# Patient Record
Sex: FEMALE | Race: WHITE | Employment: FULL TIME | URBAN - METROPOLITAN AREA
[De-identification: names, ages, dates, MRNs, and addresses within clinical notes are randomized per-mention and may not be internally consistent; named-entity substitution may affect disease eponyms.]

---

## 2017-01-27 ENCOUNTER — OFFICE VISIT (OUTPATIENT)
Dept: FAMILY MEDICINE CLINIC | Age: 57
End: 2017-01-27

## 2017-01-27 VITALS
TEMPERATURE: 98.2 F | RESPIRATION RATE: 12 BRPM | BODY MASS INDEX: 39.47 KG/M2 | HEART RATE: 91 BPM | SYSTOLIC BLOOD PRESSURE: 159 MMHG | OXYGEN SATURATION: 90 % | WEIGHT: 245.6 LBS | DIASTOLIC BLOOD PRESSURE: 89 MMHG | HEIGHT: 66 IN

## 2017-01-27 DIAGNOSIS — E11.9 TYPE 2 DIABETES MELLITUS WITHOUT COMPLICATION, WITHOUT LONG-TERM CURRENT USE OF INSULIN (HCC): ICD-10-CM

## 2017-01-27 DIAGNOSIS — J44.9 CHRONIC OBSTRUCTIVE PULMONARY DISEASE, UNSPECIFIED COPD TYPE (HCC): Primary | ICD-10-CM

## 2017-01-27 DIAGNOSIS — I10 ESSENTIAL HYPERTENSION: ICD-10-CM

## 2017-01-27 DIAGNOSIS — F17.200 TOBACCO USE DISORDER: ICD-10-CM

## 2017-01-27 DIAGNOSIS — E66.9 OBESITY, CLASS II, BMI 35-39.9: ICD-10-CM

## 2017-01-27 LAB — HBA1C MFR BLD HPLC: 7.1 %

## 2017-01-27 RX ORDER — ALBUTEROL SULFATE 90 UG/1
1 AEROSOL, METERED RESPIRATORY (INHALATION)
Qty: 1 INHALER | Refills: 5 | Status: SHIPPED | OUTPATIENT
Start: 2017-01-27 | End: 2017-07-19 | Stop reason: SDUPTHER

## 2017-01-27 RX ORDER — LEVALBUTEROL INHALATION SOLUTION 1.25 MG/3ML
1.25 SOLUTION RESPIRATORY (INHALATION)
Qty: 30 NEBULE | Refills: 3 | Status: SHIPPED | OUTPATIENT
Start: 2017-01-27 | End: 2017-10-02 | Stop reason: SDUPTHER

## 2017-01-27 RX ORDER — FLUTICASONE FUROATE AND VILANTEROL 200; 25 UG/1; UG/1
1 POWDER RESPIRATORY (INHALATION) DAILY
Qty: 1 INHALER | Refills: 5 | Status: SHIPPED | OUTPATIENT
Start: 2017-01-27 | End: 2017-10-02 | Stop reason: SDUPTHER

## 2017-01-27 NOTE — PROGRESS NOTES
HISTORY OF PRESENT ILLNESS  Kd Henson is a 64 y.o. female. HPI  Cardiovascular Review:  The patient has diabetes, hypertension and hyperlipidemia. Diet and Lifestyle: generally follows a low fat low cholesterol diet, generally follows a low sodium diet, does not rigorously follow a diabetic diet, sedentary, smoker 1 ppd  Home BP Monitoring: is not measured at home. Pertinent ROS: taking medications as instructed, no medication side effects noted, no TIA's, no chest pain on exertion, no dyspnea on exertion, no swelling of ankles. Weight up 20 lbs in last 6 months. Diabetes Mellitus:  Diabetic ROS - medication compliance: compliant most of the time, diabetic diet compliance: noncompliant some of the time, home glucose monitoring: fasting values range 120, nonfasting values range <180, further diabetic ROS: no polyuria or polydipsia, no chest pain, dyspnea or TIA's, no numbness, tingling or pain in extremities, last eye exam approximately 1 year ago. COPD Review:  The patient is being seen for follow up of COPD, asthma. Using Breo daily and Albuterol treatments weekly. Oxygen: She currently is not on home oxygen therapy. Symptoms: chronic dyspnea: severity = moderate: course of sx: intermittent. Patient uses 1 pillows at night. Patient began smoking 30 years ago and currently smokes 1/2 packs per day. Current Outpatient Prescriptions   Medication Sig Dispense Refill    sitaGLIPtin-metFORMIN (JANUMET)  mg per tablet Take 1 Tab by mouth two (2) times daily (with meals). For diabetes 60 Tab 5    albuterol (PROAIR HFA) 90 mcg/actuation inhaler Take 1 Puff by inhalation every four (4) hours as needed for Wheezing. 1 Inhaler 5    levalbuterol (XOPENEX) 1.25 mg/3 mL nebu 3 mL by Nebulization route three (3) times daily as needed. 1 Package 5    fluticasone-vilanterol (BREO ELLIPTA) 200-25 mcg/dose inhaler Take 1 Puff by inhalation daily.  1 Inhaler 5    Nebulizer & Compressor machine Machine and supplies for as needed nebulizer treatments. Dx: J44.9 1 Each 0     Past Medical History   Diagnosis Date    Anxiety     Arthralgia     Asthma     Asthma     Chronic obstructive pulmonary disease ([facility]      PFT 8/2015    Crack cocaine use      quit; used for 8 years    Diabetes ([facility]     Hemorrhoid     Psychiatric disorder      ANXIETY.  Sleep apnea, obstructive 2016    Tobacco use       Lab Results   Component Value Date/Time    Hemoglobin A1c 6.2 03/16/2016 04:56 AM    Hemoglobin A1c 6.6 02/23/2016 08:43 AM    Hemoglobin A1c 6.9 08/04/2015 03:05 PM    Hemoglobin A1c 6.1 05/15/2012 08:47 AM    Glucose 127 08/22/2016 10:44 AM    Glucose (POC) 135 03/17/2016 11:31 AM    Microalb/Creat ratio (ug/mg creat.) 7.7 02/23/2016 08:43 AM    LDL, calculated 65 08/22/2016 10:44 AM    Creatinine 0.72 08/22/2016 10:44 AM      Lab Results   Component Value Date/Time    Cholesterol, total 140 08/22/2016 10:44 AM    Cholesterol, Total 141 10/01/2012 10:54 AM    HDL Cholesterol 41 08/22/2016 10:44 AM    LDL, calculated 65 08/22/2016 10:44 AM    Triglyceride 172 08/22/2016 10:44 AM       Review of Systems   Constitutional: Negative for malaise/fatigue and weight loss. Respiratory: Positive for cough and wheezing. Negative for sputum production. Cardiovascular: Negative for palpitations and leg swelling. Gastrointestinal: Negative for heartburn. Musculoskeletal: Negative for back pain, joint pain and myalgias. Neurological: Negative for dizziness and weakness. Psychiatric/Behavioral: Negative for depression. Physical Exam   Constitutional: She is oriented to person, place, and time. She appears well-developed and well-nourished. Neck: Normal range of motion. Neck supple. No JVD present. Carotid bruit is not present. No thyromegaly present. Cardiovascular: Normal rate, regular rhythm and intact distal pulses. Exam reveals no gallop and no friction rub.     No murmur heard.  Pulmonary/Chest: Effort normal. No respiratory distress. She has decreased breath sounds in the right lower field and the left lower field. She has no wheezes. She has no rhonchi. Musculoskeletal: She exhibits no edema. Lymphadenopathy:     She has no cervical adenopathy. Neurological: She is alert and oriented to person, place, and time. Psychiatric: She has a normal mood and affect. Her behavior is normal.   Nursing note and vitals reviewed. ASSESSMENT and Joe Kay was seen today for documentation. Diagnoses and all orders for this visit:    Chronic obstructive pulmonary disease, unspecified COPD type ([address]  Stable, no changes to current therapy. FMLA forms completed. -     albuterol (PROAIR HFA) 90 mcg/actuation inhaler; Take 1 Puff by inhalation every four (4) hours as needed for Wheezing.  -     levalbuterol (XOPENEX) 1.25 mg/3 mL nebu; 3 mL by Nebulization route every six (6) hours as needed. -     fluticasone-vilanterol (BREO ELLIPTA) 200-25 mcg/dose inhaler; Take 1 Puff by inhalation daily. Essential hypertension  Not to goal. Recommended adding ACE or ARB for blood pressure control and protection from diabetic neuropathy. This is refused by patient. She will follow up with PCP in one month to discuss the possible need for additional medication. Type 2 diabetes mellitus without complication, without long-term current use of insulin ([facility])  AMB POC HEMOGLOBIN A1C: 7.1%. Borderline control. Reviewed diet and lifestyle changes. -     Refill SITagliptin-metFORMIN (JANUMET)  mg per tablet; Take 1 Tab by mouth two (2) times daily (with meals). For diabetes    Obesity, Class II, BMI 35-39.9 ([facility])  Reviewed diet and lifestyle changes. Tobacco use disorder  Counseled patient on need to quit smoking. I have discussed the diagnosis with the patient and the intended plan as seen in the above orders.   The patient has received an after-visit summary along with patient information handout. I have discussed medication side effects and warnings with the patient as well. Follow-up Disposition:  Return in about 4 months (around 5/27/2017) for diabetes.

## 2017-01-27 NOTE — PATIENT INSTRUCTIONS

## 2017-01-27 NOTE — MR AVS SNAPSHOT
Visit Information Date & Time Provider Department Dept. Phone Encounter #  
 1/27/2017  4:15 PM Domo Lyon  [facility] [address] [phone] [id number] Follow-up Instructions Return in about 4 months (around 5/27/2017) for diabetes. Your Appointments 2/20/2017  9:45 AM  
ROUTINE CARE with Andree Nj MD  
[facility]) Appt Note: 6 months follow up visit [address] [phone]  
[phone]  
  
   
 [address] [phone] Upcoming Health Maintenance Date Due  
 EYE EXAM RETINAL OR DILATED Q1 7/21/2015 PAP AKA CERVICAL CYTOLOGY 1/30/2016 HEMOGLOBIN A1C Q6M 2/22/2017 MICROALBUMIN Q1 2/23/2017 BREAST CANCER SCRN MAMMOGRAM 4/28/2017 LIPID PANEL Q1 8/22/2017 FOOT EXAM Q1 8/30/2017 COLONOSCOPY 1/10/2019 DTaP/Tdap/Td series (2 - Td) 12/21/2020 Allergies as of 1/27/2017  Review Complete On: 1/27/2017 By: Domo Lyon NP Severity Noted Reaction Type Reactions Pcn [Penicillins] High 07/28/2010    Anaphylaxis Clindamycin  07/28/2010    Itching SWELLING & ITCHING IN VAGINAL AREA. Current Immunizations  Reviewed on 4/15/2015 Name Date Influenza Vaccine (Quad) PF 10/2/2015 Influenza Vaccine Split 12/21/2010 Pneumococcal Vaccine (Unspecified Type) 1/1/2009 TDAP Vaccine 12/21/2010 Not reviewed this visit You Were Diagnosed With   
  
 Codes Comments Chronic obstructive pulmonary disease, unspecified COPD type ([facility]    -  Primary ICD-10-CM: J44.9 ICD-9-CM: 196 Essential hypertension     ICD-10-CM: I10 
ICD-9-CM: 401.9 Type 2 diabetes mellitus without complication, without long-term current use of insulin (HCC)     ICD-10-CM: E11.9 ICD-9-CM: 250.00 Obesity, Class II, BMI 35-39.9 (HCC)     ICD-10-CM: E66.01 
ICD-9-CM: 278.01 Tobacco use disorder     ICD-10-CM: F17.200 ICD-9-CM: 305.1 Vitals BP Pulse Temp Resp Height(growth percentile) Weight(growth percentile) 159/89 (BP 1 Location: Left arm, BP Patient Position: Sitting) 91 98.2 °F (36.8 °C) (Oral) 12 5' 6\" (1.676 m) 245 lb 9.6 oz (111.4 kg) SpO2 BMI OB Status Smoking Status 90% 39.64 kg/m2 Postmenopausal Current Some Day Smoker Vitals History BMI and BSA Data Body Mass Index Body Surface Area  
 39.64 kg/m 2 2.28 m 2 Preferred Pharmacy Pharmacy Name Phone [facility] [address] [facility] [phone] Your Updated Medication List  
  
   
This list is accurate as of: 1/27/17  5:00 PM.  Always use your most recent med list.  
  
  
  
  
 albuterol 90 mcg/actuation inhaler Commonly known as:  PROAIR HFA Take 1 Puff by inhalation every four (4) hours as needed for Wheezing. fluticasone-vilanterol 200-25 mcg/dose inhaler Commonly known as:  BREO ELLIPTA Take 1 Puff by inhalation daily. levalbuterol 1.25 mg/3 mL Nebu Commonly known as:  XOPENEX  
3 mL by Nebulization route every six (6) hours as needed. Nebulizer & Compressor machine Machine and supplies for as needed nebulizer treatments. Dx: M26.2 SITagliptin-metFORMIN  mg per tablet Commonly known as:  [place] Take 1 Tab by mouth two (2) times daily (with meals). For diabetes Prescriptions Sent to Pharmacy Refills SITagliptin-metFORMIN (JANUMET)  mg per tablet 5 Sig: Take 1 Tab by mouth two (2) times daily (with meals). For diabetes Class: Normal  
 Pharmacy: [address] [place] [address] Ph #: [phone] Route: Oral  
 albuterol (PROAIR HFA) 90 mcg/actuation inhaler 5 Sig: Take 1 Puff by inhalation every four (4) hours as needed for Wheezing. Class: Normal  
 Pharmacy: [id number] [place] [address] Ph #: [phone] Route: Inhalation  
 levalbuterol (XOPENEX) 1.25 mg/3 mL nebu 3 Sig: 3 mL by Nebulization route every six (6) hours as needed. Class: Normal  
 Pharmacy: [address] Ph #: [phone] Route: Nebulization  
 fluticasone-vilanterol (BREO ELLIPTA) 200-25 mcg/dose inhaler 5 Sig: Take 1 Puff by inhalation daily. Class: Normal  
 Pharmacy: [address] Ph #: [phone] Route: Inhalation We Performed the Following AMB POC HEMOGLOBIN A1C [34871 CPT(R)] Follow-up Instructions Return in about 4 months (around 5/27/2017) for diabetes. Patient Instructions Nutrition Tips for Diabetes: After Your Visit Your Care Instructions A healthy diet is important to manage diabetes. It helps you lose weight (if you need to) and keep it off. It gives you the nutrition and energy your body needs and helps prevent heart disease. But a diet for diabetes does not mean that you have to eat special foods. You can eat what your family eats, including occasional sweets and other favorites. But you do have to pay attention to how often you eat and how much you eat of certain foods. The right plan for you will give you meals that help you keep your blood sugar at healthy levels. Try to eat a variety of foods and to spread carbohydrate throughout the day. Carbohydrate raises blood sugar higher and more quickly than any other nutrient does. Carbohydrate is found in sugar, breads and cereals, fruit, starchy vegetables such as potatoes and corn, and milk and yogurt. You may want to work with a dietitian or diabetes educator to help you plan meals and snacks. A dietitian or diabetes educator also can help you lose weight if that is one of your goals. The following tips can help you enjoy your meals and stay healthy. Follow-up care is a key part of your treatment and safety.  Be sure to make and go to all appointments, and call your doctor if you are having problems. Its also a good idea to know your test results and keep a list of the medicines you take. How can you care for yourself at home? · Learn which foods have carbohydrate and how much carbohydrate to eat. A dietitian or diabetes educator can help you learn to keep track of how much carbohydrate you eat. · Spread carbohydrate throughout the day. Eat some carbohydrate at all meals, but do not eat too much at any one time. · Plan meals to include food from all the food groups. These are the food groups and some example portion sizes: ¨ Grains: 1 slice of bread (1 ounce), ½ cup of cooked cereal, and 1/3 cup of cooked pasta or rice. These have about 15 grams of carbohydrate in a serving. Choose whole grains such as whole wheat bread or crackers, oatmeal, and brown rice more often than refined grains. ¨ Fruit: 1 small fresh fruit, such as an apple or orange; ½ of a banana; ½ cup of chopped, cooked, or canned fruit; ½ cup of fruit juice; 1 cup of melon or raspberries; and 2 tablespoons of dried fruit. These have about 15 grams of carbohydrate in a serving. ¨ Dairy: 1 cup of nonfat or low-fat milk and 2/3 cup of plain yogurt. These have about 15 grams of carbohydrate in a serving. ¨ Protein foods: Beef, chicken, turkey, fish, eggs, tofu, cheese, cottage cheese, and peanut butter. A serving size of meat is 3 ounces, which is about the size of a deck of cards. Examples of meat substitute serving sizes (equal to 1 ounce of meat) are 1/4 cup of cottage cheese, 1 egg, 1 tablespoon of peanut butter, and ½ cup of tofu. These have very little or no carbohydrate per serving. ¨ Vegetables: Starchy vegetables such as ½ cup of cooked dried beans, peas, potatoes, or corn have about 15 grams of carbohydrate.  Nonstarchy vegetables have very little carbohydrate, such as 1 cup of raw leafy vegetables (such as spinach), ½ cup of other vegetables (cooked or chopped), and 3/4 cup of vegetable juice. · Use the plate format to plan meals. It is a good, quick way to make sure that you have a balanced meal. It also helps you spread carbohydrate throughout the day. You divide your plate by types of foods. Put vegetables on half the plate, meat or meat substitutes on one-quarter of the plate, and a grain or starchy vegetable (such as brown rice or a potato) in the final quarter of the plate. To this you can add a small piece of fruit and 1 cup of milk or yogurt, depending on how much carbohydrate you are supposed to eat at a meal. 
· Talk to your dietitian or diabetes educator about ways to add limited amounts of sweets into your meal plan. You can eat these foods now and then, as long as you include the amount of carbohydrate they have in your daily carbohydrate allowance. · If you drink alcohol, limit it to no more than 1 drink a day for women and 2 drinks a day for men. If you are pregnant, no amount of alcohol is known to be safe. · Protein, fat, and fiber do not raise blood sugar as much as carbohydrate does. If you eat a lot of these nutrients in a meal, your blood sugar will rise more slowly than it would otherwise. · Limit saturated fats, such as those from meat and dairy products. Try to replace it with monounsaturated fat, such as olive oil. This is a healthier choice because people who have diabetes are at higher-than-average risk of heart disease. But use a modest amount of olive oil. A tablespoon of olive oil has 14 grams of fat and 120 calories. · Exercise lowers blood sugar. If you take insulin by shots or pump, you can use less than you would if you were not exercising. Keep in mind that timing matters. If you exercise within 1 hour after a meal, your body may need less insulin for that meal than it would if you exercised 3 hours after the meal. Test your blood sugar to find out how exercise affects your need for insulin. · Exercise on most days of the week. Aim for at least 30 minutes. Exercise helps you stay at a healthy weight and helps your body use insulin. Walking is an easy way to get exercise. Gradually increase the amount you walk every day. You also may want to swim, bike, or do other activities. When you eat out · Learn to estimate the serving sizes of foods that have carbohydrate. If you measure food at home, it will be easier to estimate the amount in a serving of restaurant food. · If the meal you order has too much carbohydrate (such as potatoes, corn, or baked beans), ask to have a low-carbohydrate food instead. Ask for a salad or green vegetables. · If you use insulin, check your blood sugar before and after eating out to help you plan how much to eat in the future. · If you eat more carbohydrate at a meal than you had planned, take a walk or do other exercise. This will help lower your blood sugar. Where can you learn more? Go to Dealflicks.be Enter Q904 in the search box to learn more about \"Nutrition Tips for Diabetes: After Your Visit. \"  
© 7792-8772 Healthwise, Incorporated. Care instructions adapted under license by Mehran Vick (which disclaims liability or warranty for this information). This care instruction is for use with your licensed healthcare professional. If you have questions about a medical condition or this instruction, always ask your healthcare professional. [address] any warranty or liability for your use of this information. Content Version: 48.5.385628; Current as of: June 4, 2014 Introducing [facility] & HE[facility] SERVICES! Dear Esperanza Hammond: Thank you for requesting a [employer] account. Our records indicate that you already have an active [employer] account. You can access your account anytime at [email or web address] Did you know that you can access your hospital and ER discharge instructions at any time in [employer]? You can also review all of your test results from your hospital stay or ER visit. Additional Information If you have questions, please visit the Frequently Asked Questions section of the [employer] website at [email or web address] Remember, [employer] is NOT to be used for urgent needs. For medical emergencies, dial 911. Now available from your iPhone and Android! Please provide this summary of care documentation to your next provider. Your primary care clinician is listed as Thuy Landry. If you have any questions after today's visit, please call [phone].

## 2017-01-27 NOTE — PROGRESS NOTES
1. Have you been to the ER, urgent care clinic since your last visit? Hospitalized since your last visit? No    2. Have you seen or consulted any other health care providers outside of the [address] [place] [address] since your last visit? Include any pap smears or colon screening.  No       Chief Complaint   Patient presents with    Documentation     FMLA for COPD

## 2017-04-12 ENCOUNTER — OFFICE VISIT (OUTPATIENT)
Dept: FAMILY MEDICINE CLINIC | Age: 57
End: 2017-04-12

## 2017-04-12 VITALS
DIASTOLIC BLOOD PRESSURE: 81 MMHG | TEMPERATURE: 98.6 F | HEART RATE: 87 BPM | OXYGEN SATURATION: 90 % | WEIGHT: 236.6 LBS | SYSTOLIC BLOOD PRESSURE: 128 MMHG | RESPIRATION RATE: 16 BRPM | HEIGHT: 66 IN | BODY MASS INDEX: 38.02 KG/M2

## 2017-04-12 DIAGNOSIS — J44.9 CHRONIC OBSTRUCTIVE PULMONARY DISEASE, UNSPECIFIED COPD TYPE (HCC): ICD-10-CM

## 2017-04-12 DIAGNOSIS — J45.30 MILD PERSISTENT ASTHMA WITHOUT COMPLICATION: ICD-10-CM

## 2017-04-12 DIAGNOSIS — K63.5 POLYP OF COLON, UNSPECIFIED PART OF COLON, UNSPECIFIED TYPE: ICD-10-CM

## 2017-04-12 DIAGNOSIS — E66.9 OBESITY, CLASS II, BMI 35-39.9: ICD-10-CM

## 2017-04-12 DIAGNOSIS — Z12.31 VISIT FOR SCREENING MAMMOGRAM: ICD-10-CM

## 2017-04-12 DIAGNOSIS — E78.6 LOW HDL (UNDER 40): ICD-10-CM

## 2017-04-12 DIAGNOSIS — E11.9 TYPE 2 DIABETES MELLITUS WITHOUT COMPLICATION, WITHOUT LONG-TERM CURRENT USE OF INSULIN (HCC): Primary | ICD-10-CM

## 2017-04-12 DIAGNOSIS — R60.9 EDEMA, UNSPECIFIED TYPE: ICD-10-CM

## 2017-04-12 DIAGNOSIS — F17.200 TOBACCO USE DISORDER: ICD-10-CM

## 2017-04-12 RX ORDER — FUROSEMIDE 20 MG/1
20 TABLET ORAL DAILY
Qty: 30 TAB | Refills: 5 | Status: SHIPPED | OUTPATIENT
Start: 2017-04-12

## 2017-04-12 NOTE — MR AVS SNAPSHOT
Visit Information Date & Time Provider Department Dept. Phone Encounter #  
 4/12/2017 10:00 AM Carine Camacho  [address] [phone] [id number] Follow-up Instructions Return in about 2 weeks (around 4/26/2017) for COPD follow up, 10:00 AM. Upcoming Health Maintenance Date Due  
 EYE EXAM RETINAL OR DILATED Q1 7/21/2015 PAP AKA CERVICAL CYTOLOGY 1/30/2016 MICROALBUMIN Q1 2/23/2017 BREAST CANCER SCRN MAMMOGRAM 4/28/2017 HEMOGLOBIN A1C Q6M 7/27/2017 LIPID PANEL Q1 8/22/2017 FOOT EXAM Q1 8/30/2017 COLONOSCOPY 1/10/2019 DTaP/Tdap/Td series (2 - Td) 12/21/2020 Allergies as of 4/12/2017  Review Complete On: 4/12/2017 By: Carine Camacho MD  
  
 Severity Noted Reaction Type Reactions Pcn [Penicillins] High 07/28/2010    Anaphylaxis Clindamycin  07/28/2010    Itching SWELLING & ITCHING IN VAGINAL AREA. Current Immunizations  Reviewed on 4/15/2015 Name Date Influenza Vaccine (Quad) PF 10/2/2015 Influenza Vaccine Split 12/21/2010 Pneumococcal Vaccine (Unspecified Type) 1/1/2009 TDAP Vaccine 12/21/2010 Not reviewed this visit You Were Diagnosed With   
  
 Codes Comments Type 2 diabetes mellitus without complication, without long-term current use of insulin (HCC)    -  Primary ICD-10-CM: E11.9 ICD-9-CM: 250.00 Chronic obstructive pulmonary disease, unspecified COPD type ([facility]     ICD-10-CM: J44.9 ICD-9-CM: 869 Obesity, Class II, BMI 35-39.9 (HCC)     ICD-10-CM: E66.9 ICD-9-CM: 278.00 Low HDL (under 40)     ICD-10-CM: E78.6 ICD-9-CM: 272.5 Visit for screening mammogram     ICD-10-CM: Z12.31 
ICD-9-CM: V76.12 Tobacco use disorder     ICD-10-CM: F17.200 ICD-9-CM: 305.1 Mild persistent asthma without complication     JTN-83-SL: J45.30 ICD-9-CM: 493.90 Edema, unspecified type     ICD-10-CM: R60.9 ICD-9-CM: 844. 3 Vitals BP Pulse Temp Resp Height(growth percentile) Weight(growth percentile) 128/81 (BP 1 Location: Left arm, BP Patient Position: Sitting) 87 98.6 °F (37 °C) (Oral) 16 5' 6\" (1.676 m) 236 lb 9.6 oz (107.3 kg) SpO2 BMI OB Status Smoking Status 90% 38.19 kg/m2 Postmenopausal Current Some Day Smoker Vitals History BMI and BSA Data Body Mass Index Body Surface Area  
 38.19 kg/m 2 2.24 m 2 Preferred Pharmacy Pharmacy Name Phone [facility] [address] [facility] [phone] Your Updated Medication List  
  
   
This list is accurate as of: 4/12/17 10:47 AM.  Always use your most recent med list.  
  
  
  
  
 albuterol 90 mcg/actuation inhaler Commonly known as:  PROAIR HFA Take 1 Puff by inhalation every four (4) hours as needed for Wheezing. fluticasone-vilanterol 200-25 mcg/dose inhaler Commonly known as:  BREO ELLIPTA Take 1 Puff by inhalation daily. furosemide 20 mg tablet Commonly known as:  LASIX Take 1 Tab by mouth daily. levalbuterol 1.25 mg/3 mL Nebu Commonly known as:  XOPENEX  
3 mL by Nebulization route every six (6) hours as needed. Nebulizer & Compressor machine Machine and supplies for as needed nebulizer treatments. Dx: W01.7 SITagliptin-metFORMIN  mg per tablet Commonly known as:  Khoi Zhang Take 1 Tab by mouth two (2) times daily (with meals). For diabetes Prescriptions Sent to Pharmacy Refills  
 furosemide (LASIX) 20 mg tablet 5 Sig: Take 1 Tab by mouth daily. Class: Normal  
 Pharmacy: [address] [place] [address], [address] [facility] Ph #: [phone] Route: Oral  
  
We Performed the Following HEMOGLOBIN A1C WITH EAG [24941 CPT(R)] LEVALBUTEROL, INHAL. SOL., FDA-APPROVED FINAL, NON-COMPOUND UNIT DOSE, 0.5 MG [ HCPCS] LIPID PANEL [30201 CPT(R)] METABOLIC PANEL, COMPREHENSIVE [89375 CPT(R)] MICROALBUMIN, UR, RAND W/ MICROALBUMIN/CREA RATIO [id number] CPT(R)] AR INHAL RX, AIRWAY OBST/DX SPUTUM INDUCT [id number] CPT(R)] Follow-up Instructions Return in about 2 weeks (around 4/26/2017) for COPD follow up, 10:00 AM. To-Do List   
 04/12/2017 Imaging:  FRANSISCO MAMMO BI SCREENING INCL CAD Introducing [facility] & HEALTH SERVICES! Dear Latanya Navarro: Thank you for requesting a [employer] account. Our records indicate that you already have an active [employer] account. You can access your account anytime at [email or web address] Did you know that you can access your hospital and ER discharge instructions at any time in [employer]? You can also review all of your test results from your hospital stay or ER visit. Additional Information If you have questions, please visit the Frequently Asked Questions section of the [employer] website at [email or web address] Remember, [employer] is NOT to be used for urgent needs. For medical emergencies, dial 911. Now available from your iPhone and Android! Please provide this summary of care documentation to your next provider. Your primary care clinician is listed as Albino Lora. If you have any questions after today's visit, please call [phone].

## 2017-04-12 NOTE — PROGRESS NOTES
HISTORY OF PRESENT ILLNESS  Chico Gillette is a 64 y.o. female. Blood pressure 128/81, pulse 87, temperature 98.6 °F (37 °C), temperature source Oral, resp. rate 16, height 5' 6\" (1.676 m), weight 236 lb 9.6 oz (107.3 kg), SpO2 90 %. Body mass index is 38.19 kg/(m^2). Chief Complaint   Patient presents with    Diabetes    COPD      HPI   Poonam AQUINO Eliza Gillette 64 y.o. female  presents to the office today for a follow up on diabetes and COPD. Bp at office today 128/81. DM2: Last A1c 7.1% per POC on 01/17/17, increased from A1c of 6.7% on 08/22/16. Pt continues with Janumet  mg BID. Diabetes control is sub-optimal with A1c 7.1% on 01/17/17. Counseled pt on diet and exercise today. Will reassess A1c today. COPD/allergic rhinitis: Pt notes her allergies and COPD are flaring up with associated wheezing, maxillary sinus pressure, chest tightness, and sneezing. She also alternated between chills and hot flashes last night. She received Xopenex nebulizer treatment today at office and states she received a treatment last night as well. Pt also continues with her Breo Ellipta inhaler regularly and Allegra. Pt notes she checks her oxygen saturation levels at home and it has been running 90-91% in the past few days. Oxygen saturation today at office was 90%. Work note has been written today to excuse pt from work until 04/18/17 due to her COPD exacerbation. Edema: Pt notes increased LE swelling in the evenings and would like to restart her Lasix. I have advised she can start taking Lasix 20 mg daily. Health maintenance: Pt notes she will complete pap smear next week at [facility]. She is due for mammogram and orders have been placed today. Pt notes she is down to 3-4 cigarettes daily. I have counseled pt on the importance of smoking cessation. Current Outpatient Prescriptions   Medication Sig Dispense Refill    furosemide (LASIX) 20 mg tablet Take 1 Tab by mouth daily.  30 Tab 5    SITagliptin-metFORMIN (JANUMET)  mg per tablet Take 1 Tab by mouth two (2) times daily (with meals). For diabetes 60 Tab 5    albuterol (PROAIR HFA) 90 mcg/actuation inhaler Take 1 Puff by inhalation every four (4) hours as needed for Wheezing. 1 Inhaler 5    levalbuterol (XOPENEX) 1.25 mg/3 mL nebu 3 mL by Nebulization route every six (6) hours as needed. 30 Nebule 3    fluticasone-vilanterol (BREO ELLIPTA) 200-25 mcg/dose inhaler Take 1 Puff by inhalation daily. 1 Inhaler 5    Nebulizer & Compressor machine Machine and supplies for as needed nebulizer treatments. Dx: J44.9 1 Each 0     Allergies   Allergen Reactions    Pcn [Penicillins] Anaphylaxis    Clindamycin Itching     SWELLING & ITCHING IN VAGINAL AREA. Past Medical History:   Diagnosis Date    Anxiety     Arthralgia     Asthma     Asthma     Chronic obstructive pulmonary disease ([address]     PFT 8/2015    Crack cocaine use     quit; used for 8 years    Diabetes ([address]     Hemorrhoid     Psychiatric disorder     ANXIETY.  Sleep apnea, obstructive 2016    Tobacco use      Past Surgical History:   Procedure Laterality Date    ENDOSCOPY, COLON, DIAGNOSTIC  2008    F/U 5 years Dr. Stanford Stark HX GI      COLONOSCOPY X 1    HX HEENT      WISDOM TEETH EXTRACTION. Family History   Problem Relation Age of Onset   Elzie Barefoot Cancer Mother      colon    Diabetes Father     Hypertension Father      Social History   Substance Use Topics    Smoking status: Current Some Day Smoker     Packs/day: 0.25     Years: 22.00     Types: Cigarettes    Smokeless tobacco: Never Used      Comment: smokes about 5 cigarettes/day    Alcohol use No        Review of Systems   Constitutional: Positive for chills. Negative for malaise/fatigue. HENT:        + maxillary sinus pressure  + sneezing    Eyes: Negative for blurred vision. Respiratory: Positive for shortness of breath and wheezing.     Cardiovascular: Negative for chest pain and leg swelling. Musculoskeletal: Negative. Neurological: Negative. Negative for dizziness and headaches. Endo/Heme/Allergies: Positive for environmental allergies. All other systems reviewed and are negative. Physical Exam   Constitutional: She is oriented to person, place, and time. She appears well-developed and well-nourished. No distress. HENT:   Head: Normocephalic and atraumatic. Neck: Carotid bruit is not present. Cardiovascular: Normal rate, regular rhythm, normal heart sounds and intact distal pulses. Exam reveals no gallop and no friction rub. No murmur heard. Pulmonary/Chest: Effort normal. No respiratory distress. She has wheezes (scattered bilaterally ). She has no rales. Musculoskeletal: She exhibits edema (1+ ). Neurological: She is alert and oriented to person, place, and time. Skin: She is not diaphoretic. Psychiatric: She has a normal mood and affect. Her behavior is normal. Judgment and thought content normal.   Nursing note and vitals reviewed. ASSESSMENT and Odette Dash was seen today for diabetes and copd. Diagnoses and all orders for this visit:    Type 2 diabetes mellitus without complication, without long-term current use of insulin ([facility])  -     METABOLIC PANEL, COMPREHENSIVE  -     LIPID PANEL  -     HEMOGLOBIN A1C WITH EAG  -     MICROALBUMIN, UR, RAND W/ MICROALBUMIN/CREA RATIO  - Diabetes control is sub-optimal with A1c 7.1% per POC on 01/17/17. Counseled pt on diet and exercise. Will reassess A1c today. Chronic obstructive pulmonary disease, unspecified COPD type ([facility])  -     LEVALBUTEROL, INHAL. SOL., FDA-APPROVED FINAL, NON-COMPOUND UNIT DOSE, 0.5 MG  -     AK INHAL RX, AIRWAY OBST/DX SPUTUM INDUCT  - Continue with Xopenex nebulizer treatment every 4-6 hours until breathing clears up and continue to monitor pulse ox. Also take Breo Elliptor inhaler regularly. A work note has been written for pt today to excuse her from work until 04/18/17. Obesity, Class II, BMI 35-39.9 ([facility])  I have reviewed/discussed the above normal BMI with the patient. I have recommended the following interventions: dietary management education, guidance, and counseling, encourage exercise and monitor weight . Low HDL (under 40)  -     LIPID PANEL  - Presumed stable, will assess levels today    Visit for screening mammogram  -     [facility] MAMMO BI SCREENING INCL CAD; Future    Tobacco use disorder  The patient was counseled on the dangers of tobacco use, and was advised to quit. Reviewed strategies to maximize success, including removing cigarettes and smoking materials from environment. Mild persistent asthma without complication  Continue with Xopenex nebulizer treatment every 4-6 hours until breathing clears up and continue to monitor pulse ox. Also take Breo Elliptor inhaler regularly. A work note has been written for pt to excuse her from work until 04/18/17 due to COPD exacerbation. Edema, unspecified type  -     furosemide (LASIX) 20 mg tablet; Take 1 Tab by mouth daily.  - Pt has 1+ edema bilaterally. Advised her to restart Lasix for improvement. Polyp of colon, unspecified part of colon, unspecified type  Pt advised to follow up in three years for repeat colonoscopy      Follow-up Disposition:  Return in about 2 weeks (around 4/26/2017) for COPD follow up, 10:00 AM.     Medication risks/benefits/costs/interactions/alternatives discussed with patient. Advised patient to call back or return to office if symptoms worsen/change/persist.  If patient cannot reach us or should anything more severe/urgent arise he/she should proceed directly to the nearest emergency department. Discussed expected course/resolution/complications of diagnosis in detail with patient. Patient given a written after visit summary which includes her diagnoses, current medications and vitals. Patient expressed understanding with the diagnosis and plan.     Written by Johanne Shone, scribe, as dictated by Moira Delong M.D.    I have reviewed and agree with the above note and have made corrections where appropriate, Dr. Stiven Velasquez MD

## 2017-04-12 NOTE — LETTER
NOTIFICATION RETURN TO WORK  
 
4/12/2017 10:43 AM 
 
Ms. Bhavna Fine [address] [phone] To Whom It May Concern: 
 
Bhavna Fine is currently under the care of CHARLIE Cat 53. She will return to work on: 4/18/17 Pt has been taken out due to COPD exacerbation. If there are questions or concerns please have the patient contact our office. Sincerely, Eneida Barnes MD

## 2017-04-12 NOTE — PROGRESS NOTES
Chief Complaint   Patient presents with    Diabetes    COPD       1. Have you been to the ER, urgent care clinic since your last visit? Hospitalized since your last visit? No    2. Have you seen or consulted any other health care providers outside of the [address] [place] [address] since your last visit? Include any pap smears or colon screening. Dr. Isatu Monet, dermatologist    Body mass index is 38.19 kg/(m^2).

## 2017-04-13 ENCOUNTER — TELEPHONE (OUTPATIENT)
Dept: FAMILY MEDICINE CLINIC | Age: 57
End: 2017-04-13

## 2017-04-13 DIAGNOSIS — J45.40 MODERATE PERSISTENT ASTHMA WITHOUT COMPLICATION: Primary | ICD-10-CM

## 2017-04-13 LAB
ALBUMIN SERPL-MCNC: 4.4 G/DL (ref 3.5–5.5)
ALBUMIN/CREAT UR: 5.6 MG/G CREAT (ref 0–30)
ALBUMIN/GLOB SERPL: 1.4 {RATIO} (ref 1.2–2.2)
ALP SERPL-CCNC: 93 IU/L (ref 39–117)
ALT SERPL-CCNC: 15 IU/L (ref 0–32)
AST SERPL-CCNC: 11 IU/L (ref 0–40)
BILIRUB SERPL-MCNC: 0.4 MG/DL (ref 0–1.2)
BUN SERPL-MCNC: 13 MG/DL (ref 6–24)
BUN/CREAT SERPL: 21 (ref 9–23)
CALCIUM SERPL-MCNC: 9.8 MG/DL (ref 8.7–10.2)
CHLORIDE SERPL-SCNC: 98 MMOL/L (ref 96–106)
CHOLEST SERPL-MCNC: 128 MG/DL (ref 100–199)
CO2 SERPL-SCNC: 25 MMOL/L (ref 18–29)
CREAT SERPL-MCNC: 0.61 MG/DL (ref 0.57–1)
CREAT UR-MCNC: 98.9 MG/DL
EST. AVERAGE GLUCOSE BLD GHB EST-MCNC: 146 MG/DL
GLOBULIN SER CALC-MCNC: 3.2 G/DL (ref 1.5–4.5)
GLUCOSE SERPL-MCNC: 80 MG/DL (ref 65–99)
HBA1C MFR BLD: 6.7 % (ref 4.8–5.6)
HDLC SERPL-MCNC: 31 MG/DL
INTERPRETATION, 910389: NORMAL
LDLC SERPL CALC-MCNC: 61 MG/DL (ref 0–99)
MICROALBUMIN UR-MCNC: 5.5 UG/ML
POTASSIUM SERPL-SCNC: 4.2 MMOL/L (ref 3.5–5.2)
PROT SERPL-MCNC: 7.6 G/DL (ref 6–8.5)
SODIUM SERPL-SCNC: 145 MMOL/L (ref 134–144)
TRIGL SERPL-MCNC: 178 MG/DL (ref 0–149)
VLDLC SERPL CALC-MCNC: 36 MG/DL (ref 5–40)

## 2017-04-13 RX ORDER — METHYLPREDNISOLONE 4 MG/1
TABLET ORAL
Qty: 1 DOSE PACK | Refills: 0 | Status: SHIPPED | OUTPATIENT
Start: 2017-04-13 | End: 2017-07-19 | Stop reason: ALTCHOICE

## 2017-04-13 NOTE — TELEPHONE ENCOUNTER
Patient is calling, patient states that she came in for an appointment with Dr. Vivi Terry yesterday and she was offered \"prednisone\" for her cough and refused, patient states that she was up all night last night coughing and would now like to take up Dr. Estefany Edwards offer of prescribing her this medication.      Best call back # for patient: [phone]  Pharmacy on file verified

## 2017-04-13 NOTE — TELEPHONE ENCOUNTER
[phone] Poonam attempted to call patient no answer left message on her private VM rx sent to pharmacy

## 2017-04-14 NOTE — PROGRESS NOTES
Inform pt to go to my chart to see results and recommendations    Labs are stable  Keep upf the good work  Let me know how you are doing with the cough and SOB, if symptoms progress let me know.

## 2017-04-26 ENCOUNTER — OFFICE VISIT (OUTPATIENT)
Dept: FAMILY MEDICINE CLINIC | Age: 57
End: 2017-04-26

## 2017-04-26 VITALS
WEIGHT: 242.4 LBS | RESPIRATION RATE: 15 BRPM | HEIGHT: 66 IN | BODY MASS INDEX: 38.96 KG/M2 | OXYGEN SATURATION: 94 % | HEART RATE: 86 BPM | DIASTOLIC BLOOD PRESSURE: 65 MMHG | SYSTOLIC BLOOD PRESSURE: 118 MMHG | TEMPERATURE: 98.4 F

## 2017-04-26 DIAGNOSIS — J44.9 CHRONIC OBSTRUCTIVE PULMONARY DISEASE, UNSPECIFIED COPD TYPE (HCC): Primary | ICD-10-CM

## 2017-04-26 DIAGNOSIS — F17.200 TOBACCO USE DISORDER: ICD-10-CM

## 2017-04-26 DIAGNOSIS — G47.33 OSA (OBSTRUCTIVE SLEEP APNEA): ICD-10-CM

## 2017-04-26 DIAGNOSIS — E66.9 OBESITY, CLASS II, BMI 35-39.9: ICD-10-CM

## 2017-04-26 NOTE — MR AVS SNAPSHOT
Visit Information Date & Time Provider Department Dept. Phone Encounter #  
 4/26/2017 10:00 AM Micheal Sanchez  [facility] [address] [phone] [id number] Follow-up Instructions Return in about 6 months (around 10/18/2017) for diabetes follow up. Upcoming Health Maintenance Date Due  
 EYE EXAM RETINAL OR DILATED Q1 7/21/2015 PAP AKA CERVICAL CYTOLOGY 1/30/2016 BREAST CANCER SCRN MAMMOGRAM 4/28/2017 FOOT EXAM Q1 8/30/2017 HEMOGLOBIN A1C Q6M 10/12/2017 MICROALBUMIN Q1 4/12/2018 LIPID PANEL Q1 4/12/2018 COLONOSCOPY 1/10/2019 DTaP/Tdap/Td series (2 - Td) 12/21/2020 Allergies as of 4/26/2017  Review Complete On: 4/26/2017 By: Carson Serna LPN Severity Noted Reaction Type Reactions Pcn [Penicillins] High 07/28/2010    Anaphylaxis Clindamycin  07/28/2010    Itching SWELLING & ITCHING IN VAGINAL AREA. Current Immunizations  Reviewed on 4/15/2015 Name Date Influenza Vaccine (Quad) PF 10/2/2015 Influenza Vaccine Split 12/21/2010 Pneumococcal Vaccine (Unspecified Type) 1/1/2009 TDAP Vaccine 12/21/2010 Not reviewed this visit You Were Diagnosed With   
  
 Codes Comments Chronic obstructive pulmonary disease, unspecified COPD type ([facility]    -  Primary ICD-10-CM: J44.9 ICD-9-CM: 896 Tobacco use disorder     ICD-10-CM: F17.200 ICD-9-CM: 305.1 GLENNA (obstructive sleep apnea)     ICD-10-CM: G47.33 
ICD-9-CM: 327.23 Obesity, Class II, BMI 35-39.9 (HCC)     ICD-10-CM: E66.9 ICD-9-CM: 278.00 Vitals BP Pulse Temp Resp Height(growth percentile) Weight(growth percentile)  
 118/65 (BP 1 Location: Left arm, BP Patient Position: Sitting) 86 98.4 °F (36.9 °C) (Oral) 15 5' 6\" (1.676 m) 242 lb 6.4 oz (110 kg) SpO2 BMI OB Status Smoking Status 94% 39.12 kg/m2 Postmenopausal Current Some Day Smoker Vitals History BMI and BSA Data Body Mass Index Body Surface Area 39.12 kg/m 2 2.26 m 2 Preferred Pharmacy Pharmacy Name Phone [facility] [phone] [facility] [phone] Your Updated Medication List  
  
   
This list is accurate as of: 4/26/17 10:37 AM.  Always use your most recent med list.  
  
  
  
  
 albuterol 90 mcg/actuation inhaler Commonly known as:  PROAIR HFA Take 1 Puff by inhalation every four (4) hours as needed for Wheezing. fluticasone-vilanterol 200-25 mcg/dose inhaler Commonly known as:  BREO ELLIPTA Take 1 Puff by inhalation daily. furosemide 20 mg tablet Commonly known as:  LASIX Take 1 Tab by mouth daily. levalbuterol 1.25 mg/3 mL Nebu Commonly known as:  XOPENEX  
3 mL by Nebulization route every six (6) hours as needed. methylPREDNISolone 4 mg tablet Commonly known as:  Mickiel Meres Take as directed Nebulizer & Compressor machine Machine and supplies for as needed nebulizer treatments. Dx: O76.0 SITagliptin-metFORMIN  mg per tablet Commonly known as:  Praveen Palacios Take 1 Tab by mouth two (2) times daily (with meals). For diabetes Follow-up Instructions Return in about 6 months (around 10/18/2017) for diabetes follow up. To-Do List   
 05/08/2017 9:45 AM  
  Appointment with [facility] 1 at [address] [place] [address] ([phone]) Shower or bathe using soap and water. Do not use deodorant, powder, perfumes, or lotion the day of your exam.  If your prior mammograms were not performed at [facility] please bring films with you or forward prior images 2 days before your procedure. Check in at registration 15min before your appointment time unless you were instructed to do otherwise. A script is not necessary, but if you have one, please bring it on the day of the mammogram or have it faxed to the department.   [facility] [phone] [facility]  [phone] [facility] [phone] [facility]  [phone] Critical access hospital [phone] [facility] [place] [phone] [facility]! Dear Zoraida Wang: Thank you for requesting a [employer] account. Our records indicate that you already have an active [employer] account. You can access your account anytime at [email or web address] Did you know that you can access your hospital and ER discharge instructions at any time in [employer]? You can also review all of your test results from your hospital stay or ER visit. Additional Information If you have questions, please visit the Frequently Asked Questions section of the [employer] website at [email or web address] Remember, [employer] is NOT to be used for urgent needs. For medical emergencies, dial 911. Now available from your iPhone and Android! Please provide this summary of care documentation to your next provider. Your primary care clinician is listed as Candy Dumas. If you have any questions after today's visit, please call [phone].

## 2017-04-26 NOTE — PROGRESS NOTES
HISTORY OF PRESENT ILLNESS  Taylor Seaman is a 62 y.o. female. Blood pressure 118/65, pulse 86, temperature 98.4 °F (36.9 °C), temperature source Oral, resp. rate 15, height 5' 6\" (1.676 m), weight 242 lb 6.4 oz (110 kg), SpO2 94 %. Body mass index is 39.12 kg/(m^2). Chief Complaint   Patient presents with    COPD      HPI  Hernando Dennis 62 y.o. female  presents to the office today for follow up on COPD. COPD: She states her breathing has been stable. She admits to smoking 7-8 cigarettes per day. She finds she needs a cigarette at work when she gets stressed out or when she is at home. She has tried vaping, but feels this makes her cough worse. I advised her to stop smoking by reducing cigarettes to 4 per day or to try vaping. GLENNA: Patient with a history of COPD and GLENNA. She has been using her PCP on and off and uses at least for 4 hours continuously at a time. I advised her to continue to use it as much as she can. Current Outpatient Prescriptions   Medication Sig Dispense Refill    methylPREDNISolone (MEDROL DOSEPACK) 4 mg tablet Take as directed 1 Dose Pack 0    furosemide (LASIX) 20 mg tablet Take 1 Tab by mouth daily. 30 Tab 5    SITagliptin-metFORMIN (JANUMET)  mg per tablet Take 1 Tab by mouth two (2) times daily (with meals). For diabetes 60 Tab 5    albuterol (PROAIR HFA) 90 mcg/actuation inhaler Take 1 Puff by inhalation every four (4) hours as needed for Wheezing. 1 Inhaler 5    levalbuterol (XOPENEX) 1.25 mg/3 mL nebu 3 mL by Nebulization route every six (6) hours as needed. 30 Nebule 3    fluticasone-vilanterol (BREO ELLIPTA) 200-25 mcg/dose inhaler Take 1 Puff by inhalation daily. 1 Inhaler 5    Nebulizer & Compressor machine Machine and supplies for as needed nebulizer treatments. Dx: J44.9 1 Each 0     Allergies   Allergen Reactions    Pcn [Penicillins] Anaphylaxis    Clindamycin Itching     SWELLING & ITCHING IN VAGINAL AREA.      Past Medical History: Diagnosis Date    Anxiety     Arthralgia     Asthma     Asthma     Chronic obstructive pulmonary disease ([facility]     PFT 8/2015    Crack cocaine use     quit; used for 8 years    Diabetes ([facility]     Hemorrhoid     Psychiatric disorder     ANXIETY.  Sleep apnea, obstructive 2016    Tobacco use      Past Surgical History:   Procedure Laterality Date    ENDOSCOPY, COLON, DIAGNOSTIC  2008    F/U 5 years Dr. Sarita Serrano HX GI      COLONOSCOPY X 1    HX HEENT      WISDOM TEETH EXTRACTION. Family History   Problem Relation Age of Onset    Cancer Mother      colon    Diabetes Father     Hypertension Father      Social History   Substance Use Topics    Smoking status: Current Some Day Smoker     Packs/day: 0.25     Years: 22.00     Types: Cigarettes    Smokeless tobacco: Never Used      Comment: smokes about 5 cigarettes/day    Alcohol use No        Review of Systems   Constitutional: Negative. Negative for malaise/fatigue. Eyes: Negative for blurred vision. Respiratory: Negative for shortness of breath. Cardiovascular: Negative for chest pain. Musculoskeletal: Negative. Neurological: Negative for dizziness and headaches. All other systems reviewed and are negative. Physical Exam   Constitutional: She is oriented to person, place, and time. She appears well-developed and well-nourished. No distress. HENT:   Head: Normocephalic and atraumatic. Neck: Carotid bruit is not present. Cardiovascular: Normal rate, regular rhythm, normal heart sounds and intact distal pulses. Exam reveals no gallop and no friction rub. No murmur heard. Pulmonary/Chest: No respiratory distress. She has no wheezes. She has no rales. Musculoskeletal: She exhibits no edema. Neurological: She is oriented to person, place, and time. Skin: She is not diaphoretic. Psychiatric: She has a normal mood and affect.  Her behavior is normal. Judgment and thought content normal. Nursing note and vitals reviewed. ASSESSMENT and Adia Brooks was seen today for copd. Diagnoses and all orders for this visit:    Chronic obstructive pulmonary disease, unspecified COPD type ([address]  Stable at this time. Encouraged her to stop smoking and continue using her CPAP. Continue current medication regimen. Tobacco use disorder  The patient was counseled on the dangers of tobacco use, and was advised to quit. Reviewed strategies to maximize success, including removing cigarettes and smoking materials from environment, stress management, substitution of other forms of reinforcement, support of family/friends and written materials. GLENNA (obstructive sleep apnea)  Adsided pt to use CPAP at least continuously for 4 hours every night. I emphasized the importance of using it and that it can help lower BP and decrease risk of dementia by increasing REM sleep. Obesity, Class II, BMI 35-39.9 ([facility])  I have reviewed/discussed the above normal BMI with the patient. I have recommended the following interventions: dietary management education, guidance, and counseling, encourage exercise, monitor weight and prescribed dietary intake . Sidney Barthel Other orders  -     Cancel: HEMOGLOBIN A1C WITH EAG      Follow-up Disposition:  Return in about 6 months (around 10/18/2017) for diabetes follow up. Medication risks/benefits/costs/interactions/alternatives discussed with patient. Advised patient to call back or return to office if symptoms worsen/change/persist.  If patient cannot reach us or should anything more severe/urgent arise he/she should proceed directly to the nearest emergency department. Discussed expected course/resolution/complications of diagnosis in detail with patient. Patient given a written after visit summary which includes her diagnoses, current medications and vitals. Patient expressed understanding with the diagnosis and plan.     Written by Hola Dobson, as dictated by Ray Lovett MD.   I have reviewed and agree with the above note and have made corrections where appropriate, Dr. Ray Lovett MD

## 2017-04-26 NOTE — PROGRESS NOTES
Chief Complaint   Patient presents with    COPD       1. Have you been to the ER, urgent care clinic since your last visit? Hospitalized since your last visit? No    2. Have you seen or consulted any other health care providers outside of the [address] [place] [address] since your last visit? Include any pap smears or colon screening. No    Body mass index is 39.12 kg/(m^2).

## 2017-05-08 ENCOUNTER — HOSPITAL ENCOUNTER (OUTPATIENT)
Dept: MAMMOGRAPHY | Age: 57
Discharge: HOME | End: 2017-05-08
Attending: FAMILY MEDICINE
Payer: COMMERCIAL

## 2017-05-08 DIAGNOSIS — Z12.31 VISIT FOR SCREENING MAMMOGRAM: ICD-10-CM

## 2017-05-08 PROCEDURE — 77067 SCR MAMMO BI INCL CAD: CPT

## 2017-05-08 NOTE — LETTER
5/9/2017 1:07 PM 
 
Ms. Tang Dial [address] [phone] Dear Kitty Dial: 
 
Please find your most recent results below. Resulted Orders [facility] MAMMO BI SCREENING INCL CAD Narrative STUDY: Bilateral digital screening mammogram 
 
INDICATION:  Screening. COMPARISON:  Prior studies dating back to 2009 BREAST COMPOSITION:  There are scattered areas of fibroglandular density. FINDINGS: Bilateral digital screening mammography was performed and is 
interpreted in conjunction with a computer assisted detection (CAD) system. No 
suspicious masses or calcifications are identified. There has been no 
significant change. Impression IMPRESSION: 
BI-RADS 1: Negative. No mammographic evidence of malignancy. RECOMMENDATIONS: 
Next screening mammogram is recommended in one year. The patient will be notified of these results. Please call me if you have any questions: [phone] Sincerely, [facility]

## 2017-07-19 ENCOUNTER — OFFICE VISIT (OUTPATIENT)
Dept: FAMILY MEDICINE CLINIC | Age: 57
End: 2017-07-19

## 2017-07-19 VITALS
OXYGEN SATURATION: 94 % | SYSTOLIC BLOOD PRESSURE: 142 MMHG | BODY MASS INDEX: 38.83 KG/M2 | RESPIRATION RATE: 16 BRPM | HEART RATE: 80 BPM | HEIGHT: 66 IN | DIASTOLIC BLOOD PRESSURE: 86 MMHG | WEIGHT: 241.6 LBS | TEMPERATURE: 98.4 F

## 2017-07-19 DIAGNOSIS — J40 BRONCHITIS: Primary | ICD-10-CM

## 2017-07-19 DIAGNOSIS — Z72.0 TOBACCO USE: ICD-10-CM

## 2017-07-19 DIAGNOSIS — J44.9 CHRONIC OBSTRUCTIVE PULMONARY DISEASE, UNSPECIFIED COPD TYPE (HCC): ICD-10-CM

## 2017-07-19 RX ORDER — PREDNISONE 20 MG/1
20 TABLET ORAL 2 TIMES DAILY
Qty: 10 TAB | Refills: 0 | Status: SHIPPED | OUTPATIENT
Start: 2017-07-19 | End: 2017-07-24

## 2017-07-19 RX ORDER — ALBUTEROL SULFATE 90 UG/1
1 AEROSOL, METERED RESPIRATORY (INHALATION)
Qty: 1 INHALER | Refills: 5 | Status: SHIPPED | OUTPATIENT
Start: 2017-07-19 | End: 2018-01-03 | Stop reason: SDUPTHER

## 2017-07-19 RX ORDER — AZITHROMYCIN 250 MG/1
TABLET, FILM COATED ORAL
Qty: 6 TAB | Refills: 0 | Status: SHIPPED | OUTPATIENT
Start: 2017-07-19 | End: 2017-07-24

## 2017-07-19 NOTE — LETTER
NOTIFICATION RETURN TO WORK / SCHOOL 
 
7/19/2017 6:56 PM 
 
Ms. George Nickerson [address] [facility] [address] [phone] To Whom It May Concern: 
 
George Nickerson is currently under the care of CHARLIE Blanton. She will return to work/school on: 7/24/17 If there are questions or concerns please have the patient contact our office. Sincerely, Suma Quezada, NP

## 2017-07-19 NOTE — PROGRESS NOTES
Chief Complaint   Patient presents with    Fatigue     x 1 day, no otc medication taken     Shortness of Breath    Cough     productive clear mucus     Also c/o sore rose  \"REVIEWED RECORD IN PREPARATION FOR VISIT AND HAVE OBTAINED THE NECESSARY DOCUMENTATION\"

## 2017-07-19 NOTE — PROGRESS NOTES
George Nickerson is a 62 y.o. female who was seen in clinic today (7/19/2017). Subjective:  COPD  Patient complains of dyspnea, cough and fatigue. Symptoms began 1 week ago. Denies fever. Cough is described as dry and without wheezing. No home medication used. Using Breo daily and albuterol PRN. Smoking down to 1/4 ppd. Prior to Admission medications    Medication Sig Start Date End Date Taking? Authorizing Provider   azithromycin (ZITHROMAX) 250 mg tablet Take 2 tablets today, then take 1 tablet daily 7/19/17 7/24/17 Yes Suma Quezada NP   predniSONE (DELTASONE) 20 mg tablet Take 1 Tab by mouth two (2) times a day for 5 days. 7/19/17 7/24/17 Yes Suma Quezada NP   albuterol (PROAIR HFA) 90 mcg/actuation inhaler Take 1 Puff by inhalation every four (4) hours as needed for Wheezing. 7/19/17  Yes Suma Quezada NP   furosemide (LASIX) 20 mg tablet Take 1 Tab by mouth daily. 4/12/17  Yes Meron Ospina MD   SITagliptin-metFORMIN (JANUMET)  mg per tablet Take 1 Tab by mouth two (2) times daily (with meals). For diabetes 1/27/17  Yes Suma Quezada NP   levalbuterol (XOPENEX) 1.25 mg/3 mL nebu 3 mL by Nebulization route every six (6) hours as needed. 1/27/17  Yes Suma Quezada NP   fluticasone-vilanterol (BREO ELLIPTA) 200-25 mcg/dose inhaler Take 1 Puff by inhalation daily. 1/27/17  Yes Suma Quezada NP   Nebulizer & Compressor machine Machine and supplies for as needed nebulizer treatments. Dx: J44.9 3/29/16  Yes Suma Quezada NP          Allergies   Allergen Reactions    Pcn [Penicillins] Anaphylaxis    Clindamycin Itching     SWELLING & ITCHING IN VAGINAL AREA. ROS  See HPI    Objective:   Physical Exam   Constitutional: She is oriented to person, place, and time. She appears well-developed and well-nourished. No distress.    HENT:   Right Ear: Tympanic membrane and ear canal normal.   Left Ear: Tympanic membrane and ear canal normal.   Nose: Mucosal edema present. Right sinus exhibits no maxillary sinus tenderness and no frontal sinus tenderness. Left sinus exhibits no maxillary sinus tenderness and no frontal sinus tenderness. Mouth/Throat: Oropharynx is clear and moist.   Cardiovascular: Normal rate, regular rhythm and normal heart sounds. No murmur heard. Pulmonary/Chest: Effort normal. She has no decreased breath sounds. She has wheezes (expiratory). She has rhonchi (left upper lung field). Lymphadenopathy:     She has no cervical adenopathy. Neurological: She is alert and oriented to person, place, and time. Psychiatric: She has a normal mood and affect. Her behavior is normal.   Nursing note and vitals reviewed. Visit Vitals    /86 (BP 1 Location: Right arm, BP Patient Position: Sitting)    Pulse 80    Temp 98.4 °F (36.9 °C) (Oral)    Resp 16    Ht 5' 6\" (1.676 m)    Wt 241 lb 9.6 oz (109.6 kg)    SpO2 94%    BMI 39 kg/m2       Assessment & Plan:  Poonam was seen today for fatigue, shortness of breath and cough. Diagnoses and all orders for this visit:    Bronchitis  Steroid burst and cover with antibiotics given smoking history. -     azithromycin (ZITHROMAX) 250 mg tablet; Take 2 tablets today, then take 1 tablet daily  -     predniSONE (DELTASONE) 20 mg tablet; Take 1 Tab by mouth two (2) times a day for 5 days. Tobacco use  Counseled patient on need to quit smoking. Chronic obstructive pulmonary disease, unspecified COPD type ([address]  FMLA forms completed. -     Refill albuterol (PROAIR HFA) 90 mcg/actuation inhaler; Take 1 Puff by inhalation every four (4) hours as needed for Wheezing. I have discussed the diagnosis with the patient and the intended plan as seen in the above orders. The patient has received an after-visit summary along with patient information handout. I have discussed medication side effects and warnings with the patient as well.     Follow-up Disposition:  Return in about 3 months (around 10/19/2017) for diabetes.         Darryle Pulley, NP

## 2017-07-19 NOTE — MR AVS SNAPSHOT
Visit Information Date & Time Provider Department Dept. Phone Encounter #  
 7/19/2017  6:45 PM Keara Pyle  [facility] [phone] [id number] Follow-up Instructions Return in about 3 months (around 10/19/2017) for diabetes. Your Appointments 7/24/2017  9:00 AM  
ROUTINE CARE with Keara Pyle  [facility] ([address]) Appt Note: FMLA paperwork/0cp 0pb clg 6/16/2017  
 [address] [facility] [id number]  
[phone]  
  
   
 [phone]  
  
    
 10/18/2017 10:45 AM  
ROUTINE CARE with Vanessa Mcgee MD  
[address] [facility] ([address]) Appt Note: 6 months follow up visit DM  
 [address] [phone]  
[phone]  
  
   
 [address] [phone] Upcoming Health Maintenance Date Due  
 EYE EXAM RETINAL OR DILATED Q1 7/21/2015 PAP AKA CERVICAL CYTOLOGY 1/30/2016 INFLUENZA AGE 9 TO ADULT 8/1/2017 FOOT EXAM Q1 8/30/2017 HEMOGLOBIN A1C Q6M 10/12/2017 MICROALBUMIN Q1 4/12/2018 LIPID PANEL Q1 4/12/2018 BREAST CANCER SCRN MAMMOGRAM 5/8/2018 COLONOSCOPY 1/10/2019 DTaP/Tdap/Td series (2 - Td) 12/21/2020 Allergies as of 7/19/2017  Review Complete On: 4/27/2017 By: Vanessa Mcgee MD  
  
 Severity Noted Reaction Type Reactions Pcn [Penicillins] High 07/28/2010    Anaphylaxis Clindamycin  07/28/2010    Itching SWELLING & ITCHING IN VAGINAL AREA. Current Immunizations  Reviewed on 4/15/2015 Name Date Influenza Vaccine (Quad) PF 10/2/2015 Influenza Vaccine Split 12/21/2010 Pneumococcal Vaccine (Unspecified Type) 1/1/2009 TDAP Vaccine 12/21/2010 Not reviewed this visit You Were Diagnosed With   
  
 Codes Comments Bronchitis    -  Primary ICD-10-CM: T75 ICD-9-CM: 132 Tobacco use     ICD-10-CM: Z72.0 ICD-9-CM: 305.1 Chronic obstructive pulmonary disease, unspecified COPD type ([facility]     ICD-10-CM: J44.9 ICD-9-CM: 860 Vitals BP Pulse Temp Resp Height(growth percentile) Weight(growth percentile) 142/86 (BP 1 Location: Right arm, BP Patient Position: Sitting) 80 98.4 °F (36.9 °C) (Oral) 16 5' 6\" (1.676 m) 241 lb 9.6 oz (109.6 kg) SpO2 BMI OB Status Smoking Status 94% 39 kg/m2 Postmenopausal Current Some Day Smoker Vitals History BMI and BSA Data Body Mass Index Body Surface Area  
 39 kg/m 2 2.26 m 2 Preferred Pharmacy Pharmacy Name Phone [phone] [facility] [phone] Your Updated Medication List  
  
   
This list is accurate as of: 7/19/17  7:01 PM.  Always use your most recent med list.  
  
  
  
  
 albuterol 90 mcg/actuation inhaler Commonly known as:  PROAIR HFA Take 1 Puff by inhalation every four (4) hours as needed for Wheezing. azithromycin 250 mg tablet Commonly known as:  Cleve Hire Take 2 tablets today, then take 1 tablet daily  
  
 fluticasone-vilanterol 200-25 mcg/dose inhaler Commonly known as:  BREO ELLIPTA Take 1 Puff by inhalation daily. furosemide 20 mg tablet Commonly known as:  LASIX Take 1 Tab by mouth daily. levalbuterol 1.25 mg/3 mL Nebu Commonly known as:  XOPENEX  
3 mL by Nebulization route every six (6) hours as needed. Nebulizer & Compressor machine Machine and supplies for as needed nebulizer treatments. Dx: J44.9  
  
 predniSONE 20 mg tablet Commonly known as:  Danielle Calderon Take 1 Tab by mouth two (2) times a day for 5 days. SITagliptin-metFORMIN  mg per tablet Commonly known as:  Britton Lino Take 1 Tab by mouth two (2) times daily (with meals). For diabetes Prescriptions Sent to Pharmacy Refills  
 azithromycin (ZITHROMAX) 250 mg tablet 0 Sig: Take 2 tablets today, then take 1 tablet daily  Class: Normal  
 Pharmacy: [address] Ph #: [phone]  
 predniSONE (DELTASONE) 20 mg tablet 0 Sig: Take 1 Tab by mouth two (2) times a day for 5 days. Class: Normal  
 Pharmacy: [address] Ph #: [phone] Route: Oral  
 albuterol (PROAIR HFA) 90 mcg/actuation inhaler 5 Sig: Take 1 Puff by inhalation every four (4) hours as needed for Wheezing. Class: Normal  
 Pharmacy: [address] Ph #: [phone] Route: Inhalation Follow-up Instructions Return in about 3 months (around 10/19/2017) for diabetes. Patient Instructions COPD Exacerbation Plan: Care Instructions Your Care Instructions If you have chronic obstructive pulmonary disease (COPD), your usual shortness of breath could suddenly get worse. You may start coughing more and have more mucus. This flare-up is called a COPD exacerbation (say \"ob-LIR-nc-BAY-shun\"). A lung infection or air pollution could set off an exacerbation. Sometimes it can happen after a quick change in temperature or being around chemicals. Work with your doctor to make a plan for dealing with an exacerbation. You can better manage it if you plan ahead. Follow-up care is a key part of your treatment and safety. Be sure to make and go to all appointments, and call your doctor if you are having problems. It's also a good idea to know your test results and keep a list of the medicines you take. How can you care for yourself at home? During an exacerbation · Do not panic if you start to have one. Quick treatment at home may help you prevent serious breathing problems. If you have a COPD exacerbation plan that you developed with your doctor, follow it. · Take your medicines exactly as your doctor tells you. ¨ Use your inhaler as directed by your doctor.  If your symptoms do not get better after you use your medicine, have someone take you to the emergency room. Call an ambulance if necessary. ¨ With inhaled medicines, a spacer or a nebulizer may help you get more medicine to your lungs. Ask your doctor or pharmacist how to use them properly. Practice using the spacer in front of a mirror before you have an exacerbation. This may help you get the medicine into your lungs quickly. ¨ If your doctor has given you steroid pills, take them as directed. ¨ Your doctor may have given you a prescription for antibiotics, which you can fill if you need it. ¨ Talk to your doctor if you have any problems with your medicine. And call your doctor if you have to use your antibiotic or steroid pills. Preventing an exacerbation · Do not smoke. This is the most important step you can take to prevent more damage to your lungs and prevent problems. If you already smoke, it is never too late to stop. If you need help quitting, talk to your doctor about stop-smoking programs and medicines. These can increase your chances of quitting for good. · Take your daily medicines as prescribed. · Avoid colds and flu. ¨ Get a pneumococcal vaccine. ¨ Get a flu vaccine each year, as soon as it is available. Ask those you live or work with to do the same, so they will not get the flu and infect you. ¨ Try to stay away from people with colds or the flu. ¨ Wash your hands often. · Avoid secondhand smoke; air pollution; cold, dry air; hot, humid air; and high altitudes. Stay at home with your windows closed when air pollution is bad. · Learn breathing techniques for COPD, such as breathing through pursed lips. These techniques can help you breathe easier during an exacerbation. When should you call for help? Call 911 anytime you think you may need emergency care. For example, call if: 
· You have severe trouble breathing. · You have severe chest pain. Call your doctor now or seek immediate medical care if: · You have new or worse shortness of breath. · You develop new chest pain. · You are coughing more deeply or more often, especially if you notice more mucus or a change in the color of your mucus. · You cough up blood. · You have new or increased swelling in your legs or belly. · You have a fever. Watch closely for changes in your health, and be sure to contact your doctor if: 
· You need to use your antibiotic or steroid pills. · Your symptoms are getting worse. Where can you learn more? Go to http://jennie-ross.info/. Enter E919 in the search box to learn more about \"COPD Exacerbation Plan: Care Instructions. \" Current as of: March 25, 2017 Content Version: 11.3 © 7680-1118 [employer]. Care instructions adapted under license by [employer] (which disclaims liability or warranty for this information). If you have questions about a medical condition or this instruction, always ask your healthcare professional. [place] [address] any warranty or liability for your use of this information. Introducing [facility]! Dear Jeffery Aw: Thank you for requesting a [employer] account. Our records indicate that you already have an active [employer] account. You can access your account anytime at [email or web address]. [employer] Did you know that you can access your hospital and ER discharge instructions at any time in [employer]? You can also review all of your test results from your hospital stay or ER visit. Additional Information If you have questions, please visit the Frequently Asked Questions section of the [employer] website at [email or web address] Remember, [employer] is NOT to be used for urgent needs. For medical emergencies, dial 911. Now available from your iPhone and Android! Please provide this summary of care documentation to your next provider. Your primary care clinician is listed as Cyrus Jiménez. If you have any questions after today's visit, please call [phone].

## 2017-07-19 NOTE — PATIENT INSTRUCTIONS
COPD Exacerbation Plan: Care Instructions  Your Care Instructions  If you have chronic obstructive pulmonary disease (COPD), your usual shortness of breath could suddenly get worse. You may start coughing more and have more mucus. This flare-up is called a COPD exacerbation (say \"cc-TRP-ay-BAY-fifi\"). A lung infection or air pollution could set off an exacerbation. Sometimes it can happen after a quick change in temperature or being around chemicals. Work with your doctor to make a plan for dealing with an exacerbation. You can better manage it if you plan ahead. Follow-up care is a key part of your treatment and safety. Be sure to make and go to all appointments, and call your doctor if you are having problems. It's also a good idea to know your test results and keep a list of the medicines you take. How can you care for yourself at home? During an exacerbation  · Do not panic if you start to have one. Quick treatment at home may help you prevent serious breathing problems. If you have a COPD exacerbation plan that you developed with your doctor, follow it. · Take your medicines exactly as your doctor tells you. ¨ Use your inhaler as directed by your doctor. If your symptoms do not get better after you use your medicine, have someone take you to the emergency room. Call an ambulance if necessary. ¨ With inhaled medicines, a spacer or a nebulizer may help you get more medicine to your lungs. Ask your doctor or pharmacist how to use them properly. Practice using the spacer in front of a mirror before you have an exacerbation. This may help you get the medicine into your lungs quickly. ¨ If your doctor has given you steroid pills, take them as directed. ¨ Your doctor may have given you a prescription for antibiotics, which you can fill if you need it. ¨ Talk to your doctor if you have any problems with your medicine. And call your doctor if you have to use your antibiotic or steroid pills.   Preventing an exacerbation  · Do not smoke. This is the most important step you can take to prevent more damage to your lungs and prevent problems. If you already smoke, it is never too late to stop. If you need help quitting, talk to your doctor about stop-smoking programs and medicines. These can increase your chances of quitting for good. · Take your daily medicines as prescribed. · Avoid colds and flu. ¨ Get a pneumococcal vaccine. ¨ Get a flu vaccine each year, as soon as it is available. Ask those you live or work with to do the same, so they will not get the flu and infect you. ¨ Try to stay away from people with colds or the flu. ¨ Wash your hands often. · Avoid secondhand smoke; air pollution; cold, dry air; hot, humid air; and high altitudes. Stay at home with your windows closed when air pollution is bad. · Learn breathing techniques for COPD, such as breathing through pursed lips. These techniques can help you breathe easier during an exacerbation. When should you call for help? Call 911 anytime you think you may need emergency care. For example, call if:  · You have severe trouble breathing. · You have severe chest pain. Call your doctor now or seek immediate medical care if:  · You have new or worse shortness of breath. · You develop new chest pain. · You are coughing more deeply or more often, especially if you notice more mucus or a change in the color of your mucus. · You cough up blood. · You have new or increased swelling in your legs or belly. · You have a fever. Watch closely for changes in your health, and be sure to contact your doctor if:  · You need to use your antibiotic or steroid pills. · Your symptoms are getting worse. Where can you learn more? Go to http://jennie-ross.info/. Enter Y038 in the search box to learn more about \"COPD Exacerbation Plan: Care Instructions. \"  Current as of: March 25, 2017  Content Version: 11.3  © 1219-4888 [employer]. Care instructions adapted under license by [employer] (which disclaims liability or warranty for this information). If you have questions about a medical condition or this instruction, always ask your healthcare professional. [address] any warranty or liability for your use of this information.

## 2017-07-20 ENCOUNTER — TELEPHONE (OUTPATIENT)
Dept: FAMILY MEDICINE CLINIC | Age: 57
End: 2017-07-20

## 2017-09-21 ENCOUNTER — OFFICE VISIT (OUTPATIENT)
Dept: FAMILY MEDICINE CLINIC | Age: 57
End: 2017-09-21

## 2017-09-21 VITALS
HEIGHT: 66 IN | TEMPERATURE: 98.9 F | WEIGHT: 240.6 LBS | RESPIRATION RATE: 18 BRPM | SYSTOLIC BLOOD PRESSURE: 152 MMHG | OXYGEN SATURATION: 90 % | HEART RATE: 90 BPM | BODY MASS INDEX: 38.67 KG/M2 | DIASTOLIC BLOOD PRESSURE: 82 MMHG

## 2017-09-21 DIAGNOSIS — E11.9 TYPE 2 DIABETES MELLITUS WITHOUT COMPLICATION, WITHOUT LONG-TERM CURRENT USE OF INSULIN (HCC): ICD-10-CM

## 2017-09-21 DIAGNOSIS — R19.7 DIARRHEA, UNSPECIFIED TYPE: Primary | ICD-10-CM

## 2017-09-21 NOTE — MR AVS SNAPSHOT
Visit Information Date & Time Provider Department Dept. Phone Encounter #  
 9/21/2017 11:00 AM Mireya Ruvalcaba  [facility] [phone] [id number] Your Appointments 10/18/2017  2:15 PM  
ROUTINE CARE with Ricardo Dia MD  
[facility]) Appt Note: 6 months follow up visit DM; 6 months follow up visit DM  
 [address] [address]  
[phone]  
  
   
 [address] [address] Upcoming Health Maintenance Date Due  
 EYE EXAM RETINAL OR DILATED Q1 7/21/2015 PAP AKA CERVICAL CYTOLOGY 1/30/2016 INFLUENZA AGE 9 TO ADULT 8/1/2017 FOOT EXAM Q1 8/30/2017 HEMOGLOBIN A1C Q6M 10/12/2017 MICROALBUMIN Q1 4/12/2018 LIPID PANEL Q1 4/12/2018 BREAST CANCER SCRN MAMMOGRAM 5/8/2018 COLONOSCOPY 1/10/2019 DTaP/Tdap/Td series (2 - Td) 12/21/2020 Allergies as of 9/21/2017  Review Complete On: 9/21/2017 By: Mireya Ruvalcaba MD  
  
 Severity Noted Reaction Type Reactions Pcn [Penicillins] High 07/28/2010    Anaphylaxis Clindamycin  07/28/2010    Itching SWELLING & ITCHING IN VAGINAL AREA. Current Immunizations  Reviewed on 4/15/2015 Name Date Influenza Vaccine (Quad) PF 10/2/2015 Influenza Vaccine Split 12/21/2010 Pneumococcal Vaccine (Unspecified Type) 1/1/2009 TDAP Vaccine 12/21/2010 Not reviewed this visit You Were Diagnosed With   
  
 Codes Comments Diarrhea, unspecified type    -  Primary ICD-10-CM: R19.7 ICD-9-CM: 787.91 Type 2 diabetes mellitus without complication, without long-term current use of insulin (HCC)     ICD-10-CM: E11.9 ICD-9-CM: 250.00 Vitals BP Pulse Temp Resp Height(growth percentile) Weight(growth percentile) 152/82 (BP 1 Location: Right arm, BP Patient Position: Sitting) 90 98.9 °F (37.2 °C) (Oral) 18 5' 6\" (1.676 m) 240 lb 9.6 oz (109.1 kg) SpO2 BMI OB Status Smoking Status 90% 38.83 kg/m2 Postmenopausal Current Some Day Smoker Vitals History BMI and BSA Data Body Mass Index Body Surface Area  
 38.83 kg/m 2 2.25 m 2 Preferred Pharmacy Pharmacy Name Phone [facility] [phone] [facility] [phone] Your Updated Medication List  
  
   
This list is accurate as of: 9/21/17 11:47 AM.  Always use your most recent med list.  
  
  
  
  
 albuterol 90 mcg/actuation inhaler Commonly known as:  PROAIR HFA Take 1 Puff by inhalation every four (4) hours as needed for Wheezing. fluticasone-vilanterol 200-25 mcg/dose inhaler Commonly known as:  BREO ELLIPTA Take 1 Puff by inhalation daily. furosemide 20 mg tablet Commonly known as:  LASIX Take 1 Tab by mouth daily. levalbuterol 1.25 mg/3 mL Nebu Commonly known as:  XOPENEX  
3 mL by Nebulization route every six (6) hours as needed. Nebulizer & Compressor machine Machine and supplies for as needed nebulizer treatments. Dx: Z30.2 SITagliptin-metFORMIN  mg per tablet Commonly known as:  Posey Chriss Take 1 Tab by mouth two (2) times daily (with meals). For diabetes We Performed the Following CBC WITH AUTOMATED DIFF [99948 CPT(R)] CULTURE, URINE [id number] CPT(R)] HEMOGLOBIN A1C WITH EAG [77792 CPT(R)] LIPASE [id number] CPT(R)] METABOLIC PANEL, COMPREHENSIVE [65734 CPT(R)] Patient Instructions Kylee Crooks MD 
Address: [address] [place] [address] [address] Phone: [phone] Start taking daily supplements of Metamucil to help bulk up your stool. Diarrhea: Care Instructions Your Care Instructions Diarrhea is loose, watery stools (bowel movements). The exact cause is often hard to find. Sometimes diarrhea is your body's way of getting rid of what caused an upset stomach.  Viruses, food poisoning, and many medicines can cause diarrhea. Some people get diarrhea in response to emotional stress, anxiety, or certain foods. Almost everyone has diarrhea now and then. It usually isn't serious, and your stools will return to normal soon. The important thing to do is replace the fluids you have lost, so you can prevent dehydration. The doctor has checked you carefully, but problems can develop later. If you notice any problems or new symptoms, get medical treatment right away. Follow-up care is a key part of your treatment and safety. Be sure to make and go to all appointments, and call your doctor if you are having problems. It's also a good idea to know your test results and keep a list of the medicines you take. How can you care for yourself at home? · Watch for signs of dehydration, which means your body has lost too much water. Dehydration is a serious condition and should be treated right away. Signs of dehydration are: 
¨ Increasing thirst and dry eyes and mouth. ¨ Feeling faint or lightheaded. ¨ Darker urine, and a smaller amount of urine than normal. 
· To prevent dehydration, drink plenty of fluids, enough so that your urine is light yellow or clear like water. Choose water and other caffeine-free clear liquids until you feel better. If you have kidney, heart, or liver disease and have to limit fluids, talk with your doctor before you increase the amount of fluids you drink. · Begin eating small amounts of mild foods the next day, if you feel like it. ¨ Try yogurt that has live cultures of Lactobacillus. (Check the label.) ¨ Avoid spicy foods, fruits, alcohol, and caffeine until 48 hours after all symptoms are gone. ¨ Avoid chewing gum that contains sorbitol. ¨ Avoid dairy products (except for yogurt with Lactobacillus) while you have diarrhea and for 3 days after symptoms are gone.  
· The doctor may recommend that you take over-the-counter medicine, such as loperamide (Imodium), if you still have diarrhea after 6 hours. Read and follow all instructions on the label. Do not use this medicine if you have bloody diarrhea, a high fever, or other signs of serious illness. Call your doctor if you think you are having a problem with your medicine. When should you call for help? Call 911 anytime you think you may need emergency care. For example, call if: 
· You passed out (lost consciousness). · Your stools are maroon or very bloody. Call your doctor now or seek immediate medical care if: 
· You are dizzy or lightheaded, or you feel like you may faint. · Your stools are black and look like tar, or they have streaks of blood. · You have new or worse belly pain. · You have symptoms of dehydration, such as: ¨ Dry eyes and a dry mouth. ¨ Passing only a little dark urine. ¨ Feeling thirstier than usual. 
· You have a new or higher fever. Watch closely for changes in your health, and be sure to contact your doctor if: 
· Your diarrhea is getting worse. · You see pus in the diarrhea. · You are not getting better after 2 days (48 hours). Where can you learn more? Go to http://jennie-ross.info/. Enter R303 in the search box to learn more about \"Diarrhea: Care Instructions. \" Current as of: March 20, 2017 Content Version: 11.3 © 5540-3032 [employer]pa. Care instructions adapted under license by [employer] (which disclaims liability or warranty for this information). If you have questions about a medical condition or this instruction, always ask your healthcare professional. [place] [address] any warranty or liability for your use of this information. Introducing [facility]! Dear Christie Starkey: Thank you for requesting a Bookatable (Livebookings) account. Our records indicate that you already have an active Bookatable (Livebookings) account. You can access your account anytime at [email or web address] Did you know that you can access your hospital and ER discharge instructions at any time in CultureIQ? You can also review all of your test results from your hospital stay or ER visit. Additional Information If you have questions, please visit the Frequently Asked Questions section of the CultureIQ website at [email or web address] Remember, CultureIQ is NOT to be used for urgent needs. For medical emergencies, dial 911. Now available from your iPhone and Android! Please provide this summary of care documentation to your next provider. Your primary care clinician is listed as Taz Coppola. If you have any questions after today's visit, please call [phone].

## 2017-09-21 NOTE — PROGRESS NOTES
Chief Complaint   Patient presents with    Diarrhea     x 3 weeks    Fatigue     x 3 weeks    Nausea     x 3 weeks     1. Have you been to the ER, urgent care clinic since your last visit? Hospitalized since your last visit? No    2. Have you seen or consulted any other health care providers outside of the [address] [place] [address] since your last visit? Include any pap smears or colon screening. Yes, [facility] for a breathing test and was determined she needed cpap machine.

## 2017-09-21 NOTE — LETTER
NOTIFICATION RETURN TO WORK / SCHOOL 
 
9/21/2017 11:43 AM 
 
[facility]havna Fine [address] [facility] [id number] To Whom It May Concern: 
 
Bhavna Fine is currently under the care of [facility]. She is currently is receiving medical treatment. I recommend that she could be excused from work duties until Wednesday 9/27/2017. If there are questions or concerns please have the patient contact our office. Sincerely, Caleb Li MD

## 2017-09-21 NOTE — PATIENT INSTRUCTIONS
Allie Cool MD  Address: [address] [place] [address] [address]  Phone: [phone]    Start taking daily supplements of Metamucil to help bulk up your stool. Diarrhea: Care Instructions  Your Care Instructions    Diarrhea is loose, watery stools (bowel movements). The exact cause is often hard to find. Sometimes diarrhea is your body's way of getting rid of what caused an upset stomach. Viruses, food poisoning, and many medicines can cause diarrhea. Some people get diarrhea in response to emotional stress, anxiety, or certain foods. Almost everyone has diarrhea now and then. It usually isn't serious, and your stools will return to normal soon. The important thing to do is replace the fluids you have lost, so you can prevent dehydration. The doctor has checked you carefully, but problems can develop later. If you notice any problems or new symptoms, get medical treatment right away. Follow-up care is a key part of your treatment and safety. Be sure to make and go to all appointments, and call your doctor if you are having problems. It's also a good idea to know your test results and keep a list of the medicines you take. How can you care for yourself at home? · Watch for signs of dehydration, which means your body has lost too much water. Dehydration is a serious condition and should be treated right away. Signs of dehydration are:  ¨ Increasing thirst and dry eyes and mouth. ¨ Feeling faint or lightheaded. ¨ Darker urine, and a smaller amount of urine than normal.  · To prevent dehydration, drink plenty of fluids, enough so that your urine is light yellow or clear like water. Choose water and other caffeine-free clear liquids until you feel better. If you have kidney, heart, or liver disease and have to limit fluids, talk with your doctor before you increase the amount of fluids you drink. · Begin eating small amounts of mild foods the next day, if you feel like it.   ¨ Try yogurt that has live cultures of Lactobacillus. (Check the label.)  ¨ Avoid spicy foods, fruits, alcohol, and caffeine until 48 hours after all symptoms are gone. ¨ Avoid chewing gum that contains sorbitol. ¨ Avoid dairy products (except for yogurt with Lactobacillus) while you have diarrhea and for 3 days after symptoms are gone. · The doctor may recommend that you take over-the-counter medicine, such as loperamide (Imodium), if you still have diarrhea after 6 hours. Read and follow all instructions on the label. Do not use this medicine if you have bloody diarrhea, a high fever, or other signs of serious illness. Call your doctor if you think you are having a problem with your medicine. When should you call for help? Call 911 anytime you think you may need emergency care. For example, call if:  · You passed out (lost consciousness). · Your stools are maroon or very bloody. Call your doctor now or seek immediate medical care if:  · You are dizzy or lightheaded, or you feel like you may faint. · Your stools are black and look like tar, or they have streaks of blood. · You have new or worse belly pain. · You have symptoms of dehydration, such as:  ¨ Dry eyes and a dry mouth. ¨ Passing only a little dark urine. ¨ Feeling thirstier than usual.  · You have a new or higher fever. Watch closely for changes in your health, and be sure to contact your doctor if:  · Your diarrhea is getting worse. · You see pus in the diarrhea. · You are not getting better after 2 days (48 hours). Where can you learn more? Go to http://jennie-ross.info/. Enter V928 in the search box to learn more about \"Diarrhea: Care Instructions. \"  Current as of: March 20, 2017  Content Version: 11.3  © 8496-3715 [email or web address]. Care instructions adapted under license by [employer] (which disclaims liability or warranty for this information).  If you have questions about a medical condition or this instruction, always ask your healthcare professional. [place] [address] any warranty or liability for your use of this information.

## 2017-09-21 NOTE — PROGRESS NOTES
Patient Name: Heather Sanchez   MRN: [id number]    Elyse Zamora is a 62 y.o. female who presents with the following:     Patient reports 3 week history of intermittent loose, frequent stools; described as watery and slimy. Today, she reports going 3 times. Is a  therefore bathroom access is limited. Denies recent diet changes, medication changes, fevers, blood in stool, abdominal pain, vomiting, recent travel. Father had history of colon cancer. Patient reports history of colonoscopy in 2014 with a polyp. Does have a history of COPD. Reports that she often has some diarrhea when she has a COPD flareup. Is using her rescue inhaler multiple times a day. Has an upcoming pulmonology office visit next week. Review of Systems   Constitutional: Negative for fever, malaise/fatigue and weight loss. Respiratory: Negative for cough, hemoptysis, shortness of breath and wheezing. Cardiovascular: Negative for chest pain, palpitations, leg swelling and PND. Gastrointestinal: Positive for diarrhea. Negative for abdominal pain, blood in stool, constipation, heartburn, melena, nausea and vomiting. Genitourinary: Negative for dysuria, flank pain, frequency, hematuria and urgency. The patient's medications, allergies, past medical history, surgical history, family history and social history were reviewed and updated where appropriate. Prior to Admission medications    Medication Sig Start Date End Date Taking? Authorizing Provider   albuterol (PROAIR HFA) 90 mcg/actuation inhaler Take 1 Puff by inhalation every four (4) hours as needed for Wheezing. 7/19/17  Yes Miles Bird NP   furosemide (LASIX) 20 mg tablet Take 1 Tab by mouth daily. Patient taking differently: Take 20 mg by mouth as needed. 4/12/17  Yes Leonia Canavan, MD   SITagliptin-metFORMIN (JANUMET)  mg per tablet Take 1 Tab by mouth two (2) times daily (with meals).  For diabetes 1/27/17  Yes Geovanna Rao JASON Luna   levalbuterol (XOPENEX) 1.25 mg/3 mL nebu 3 mL by Nebulization route every six (6) hours as needed. 1/27/17  Yes Adore Martinez NP   fluticasone-vilanterol (BREO ELLIPTA) 200-25 mcg/dose inhaler Take 1 Puff by inhalation daily. 1/27/17  Yes Adore Martinez NP   Nebulizer & Compressor machine Machine and supplies for as needed nebulizer treatments. Dx: J44.9 3/29/16  Yes Adore Martinez NP       Allergies   Allergen Reactions    Pcn [Penicillins] Anaphylaxis    Clindamycin Itching     SWELLING & ITCHING IN VAGINAL AREA. OBJECTIVE    Visit Vitals    /82 (BP 1 Location: Right arm, BP Patient Position: Sitting)    Pulse 90    Temp 98.9 °F (37.2 °C) (Oral)    Resp 18    Ht 5' 6\" (1.676 m)    Wt 240 lb 9.6 oz (109.1 kg)    SpO2 90%    BMI 38.83 kg/m2       Physical Exam   Constitutional: She is well-developed, well-nourished, and in no distress. No distress. HENT:   Head: Normocephalic and atraumatic. Right Ear: External ear normal.   Left Ear: External ear normal.   Eyes: Conjunctivae and EOM are normal. Pupils are equal, round, and reactive to light. Cardiovascular: Normal rate, regular rhythm and normal heart sounds. Exam reveals no gallop and no friction rub. No murmur heard. Pulmonary/Chest: Effort normal and breath sounds normal. No respiratory distress. She has no wheezes. Abdominal: Soft. Bowel sounds are normal. She exhibits no distension and no mass. There is no tenderness. There is no rebound and no guarding. Skin: She is not diaphoretic. Psychiatric: Mood, memory, affect and judgment normal.   Nursing note and vitals reviewed. ASSESSMENT AND PLAN  Haven Arango is a 62 y.o. female who presents today for:    1. Diarrhea, unspecified type  Unclear etiology. Possible viral gastroenteritis but symptom duration is approaching workup for chronic diarrhea. Recommend adding Metamucil and as needed Pepto-Bismol.   Will obtain blood work today; defers stool studies unless symptoms persist for another week. Recommend patient to follow-up with GI given family history of colon cancer. Reviewed signs and symptoms that would indicate a worsening medical condition which would require immediate evaluation and treatment; patient expressed understanding of plan. - METABOLIC PANEL, COMPREHENSIVE  - LIPASE  - CBC WITH AUTOMATED DIFF  - CULTURE, URINE    2. Type 2 diabetes mellitus without complication, without long-term current use of insulin ([facility] [address]  Patient to follow-up with PCP regarding diabetes management. She requests A1c checked today.  - HEMOGLOBIN A1C WITH EAG       There are no discontinued medications. Follow-up Disposition:  Return if symptoms worsen or fail to improve. Time: 25 minutes was spent with this patient face to face discussing test results, follow up visits, and when repeat testing. I discussed diagnoses, risk factors and treatment for each based on current recommendations and literature. Greater than 50% of total visit time was spent in counseling and coordination of care. Medication risks/benefits/costs/interactions/alternatives discussed with patient. Advised patient to call back or return to office if symptoms worsen/change/persist. If patient cannot reach us or should anything more severe/urgent arise he/she should proceed directly to the nearest emergency department. Discussed expected course/resolution/complications of diagnosis in detail with patient. Patient given a written after visit summary which includes his/her diagnoses, current medications and vitals. Patient expressed understanding with the diagnosis and plan.      Michelle Jackman M.D.

## 2017-09-22 LAB
ALBUMIN SERPL-MCNC: 4.1 G/DL (ref 3.5–5.5)
ALBUMIN/GLOB SERPL: 1.4 {RATIO} (ref 1.2–2.2)
ALP SERPL-CCNC: 86 IU/L (ref 39–117)
ALT SERPL-CCNC: 18 IU/L (ref 0–32)
AST SERPL-CCNC: 7 IU/L (ref 0–40)
BACTERIA UR CULT: NORMAL
BASOPHILS # BLD AUTO: 0 X10E3/UL (ref 0–0.2)
BASOPHILS NFR BLD AUTO: 0 %
BILIRUB SERPL-MCNC: 0.3 MG/DL (ref 0–1.2)
BUN SERPL-MCNC: 13 MG/DL (ref 6–24)
BUN/CREAT SERPL: 18 (ref 9–23)
CALCIUM SERPL-MCNC: 9.3 MG/DL (ref 8.7–10.2)
CHLORIDE SERPL-SCNC: 102 MMOL/L (ref 96–106)
CO2 SERPL-SCNC: 28 MMOL/L (ref 18–29)
CREAT SERPL-MCNC: 0.72 MG/DL (ref 0.57–1)
EOSINOPHIL # BLD AUTO: 0.1 X10E3/UL (ref 0–0.4)
EOSINOPHIL NFR BLD AUTO: 1 %
ERYTHROCYTE [DISTWIDTH] IN BLOOD BY AUTOMATED COUNT: 15.3 % (ref 12.3–15.4)
EST. AVERAGE GLUCOSE BLD GHB EST-MCNC: 154 MG/DL
GLOBULIN SER CALC-MCNC: 2.9 G/DL (ref 1.5–4.5)
GLUCOSE SERPL-MCNC: 146 MG/DL (ref 65–99)
HBA1C MFR BLD: 7 % (ref 4.8–5.6)
HCT VFR BLD AUTO: 42 % (ref 34–46.6)
HGB BLD-MCNC: 13.7 G/DL (ref 11.1–15.9)
IMM GRANULOCYTES # BLD: 0 X10E3/UL (ref 0–0.1)
IMM GRANULOCYTES NFR BLD: 0 %
LIPASE SERPL-CCNC: 52 U/L (ref 0–59)
LYMPHOCYTES # BLD AUTO: 2 X10E3/UL (ref 0.7–3.1)
LYMPHOCYTES NFR BLD AUTO: 25 %
MCH RBC QN AUTO: 28 PG (ref 26.6–33)
MCHC RBC AUTO-ENTMCNC: 32.6 G/DL (ref 31.5–35.7)
MCV RBC AUTO: 86 FL (ref 79–97)
MONOCYTES # BLD AUTO: 0.5 X10E3/UL (ref 0.1–0.9)
MONOCYTES NFR BLD AUTO: 6 %
NEUTROPHILS # BLD AUTO: 5.3 X10E3/UL (ref 1.4–7)
NEUTROPHILS NFR BLD AUTO: 68 %
PLATELET # BLD AUTO: 233 X10E3/UL (ref 150–379)
POTASSIUM SERPL-SCNC: 3.5 MMOL/L (ref 3.5–5.2)
PROT SERPL-MCNC: 7 G/DL (ref 6–8.5)
RBC # BLD AUTO: 4.89 X10E6/UL (ref 3.77–5.28)
SODIUM SERPL-SCNC: 145 MMOL/L (ref 134–144)
WBC # BLD AUTO: 8 X10E3/UL (ref 3.4–10.8)

## 2017-09-26 DIAGNOSIS — J44.9 CHRONIC OBSTRUCTIVE PULMONARY DISEASE, UNSPECIFIED COPD TYPE (HCC): ICD-10-CM

## 2017-09-26 NOTE — PROGRESS NOTES
Please notify patient regarding their test results:    No UTI. Kidney, liver, and pancreas marker appear wnl. No obvious evidence of infection per bloodwork. Pt to f/u with GI for ongoing diarrhea and fhx of colon cancer. A1c slightly elevated compared to prior; pt to review with PCP Finn.

## 2017-09-27 NOTE — PROGRESS NOTES
Incoming call from patient.  verified. Informed patient of results and recommendations.  Patient understands

## 2017-09-28 RX ORDER — LEVALBUTEROL INHALATION SOLUTION 1.25 MG/3ML
SOLUTION RESPIRATORY (INHALATION)
Qty: 75 NEBULE | Refills: 5 | Status: SHIPPED | OUTPATIENT
Start: 2017-09-28 | End: 2018-04-20 | Stop reason: SDUPTHER

## 2017-10-02 ENCOUNTER — OFFICE VISIT (OUTPATIENT)
Dept: FAMILY MEDICINE CLINIC | Age: 57
End: 2017-10-02

## 2017-10-02 VITALS
SYSTOLIC BLOOD PRESSURE: 134 MMHG | BODY MASS INDEX: 38.12 KG/M2 | TEMPERATURE: 98.7 F | OXYGEN SATURATION: 96 % | WEIGHT: 237.2 LBS | HEART RATE: 76 BPM | RESPIRATION RATE: 12 BRPM | DIASTOLIC BLOOD PRESSURE: 79 MMHG | HEIGHT: 66 IN

## 2017-10-02 DIAGNOSIS — E66.9 OBESITY, CLASS II, BMI 35-39.9: ICD-10-CM

## 2017-10-02 DIAGNOSIS — Z23 ENCOUNTER FOR IMMUNIZATION: ICD-10-CM

## 2017-10-02 DIAGNOSIS — I10 ESSENTIAL HYPERTENSION: ICD-10-CM

## 2017-10-02 DIAGNOSIS — J44.9 CHRONIC OBSTRUCTIVE PULMONARY DISEASE, UNSPECIFIED COPD TYPE (HCC): ICD-10-CM

## 2017-10-02 DIAGNOSIS — E11.9 TYPE 2 DIABETES MELLITUS WITHOUT COMPLICATION, WITHOUT LONG-TERM CURRENT USE OF INSULIN (HCC): Primary | ICD-10-CM

## 2017-10-02 DIAGNOSIS — R19.7 DIARRHEA, UNSPECIFIED TYPE: ICD-10-CM

## 2017-10-02 RX ORDER — LOSARTAN POTASSIUM 25 MG/1
25 TABLET ORAL DAILY
Qty: 90 TAB | Refills: 1 | Status: SHIPPED | OUTPATIENT
Start: 2017-10-02 | End: 2018-02-19 | Stop reason: SDUPTHER

## 2017-10-02 RX ORDER — FLUTICASONE FUROATE AND VILANTEROL 200; 25 UG/1; UG/1
1 POWDER RESPIRATORY (INHALATION) DAILY
Qty: 1 INHALER | Refills: 5 | Status: SHIPPED | OUTPATIENT
Start: 2017-10-02 | End: 2018-04-20 | Stop reason: SDUPTHER

## 2017-10-02 RX ORDER — LOSARTAN POTASSIUM AND HYDROCHLOROTHIAZIDE 25; 100 MG/1; MG/1
1 TABLET ORAL DAILY
Qty: 30 TAB | Refills: 5 | Status: SHIPPED | OUTPATIENT
Start: 2017-10-02 | End: 2017-10-02 | Stop reason: DRUGHIGH

## 2017-10-02 NOTE — PROGRESS NOTES
Wants Flu shot  Patient denies egg allergy, never had reaction to flu injection in past, not allergic thimerosol mercury based component, denies pregnancy, never had Guillain Barr's syndrome , denies fever.    Signed form and scan

## 2017-10-02 NOTE — PATIENT INSTRUCTIONS

## 2017-10-02 NOTE — MR AVS SNAPSHOT
Visit Information Date & Time Provider Department Dept. Phone Encounter #  
 10/2/2017  9:45 AM Mil Brand  W [facility] [phone] [id number] Follow-up Instructions Return in about 3 months (around 1/2/2018) for diabetes. Your Appointments 10/18/2017  2:15 PM  
ROUTINE CARE with Keegan King MD  
[facility]) Appt Note: 6 months follow up visit DM; 6 months follow up visit DM  
 [address] [phone]  
[phone]  
  
   
 [address] [address] Upcoming Health Maintenance Date Due  
 EYE EXAM RETINAL OR DILATED Q1 7/21/2015 INFLUENZA AGE 9 TO ADULT 8/1/2017 FOOT EXAM Q1 8/30/2017 HEMOGLOBIN A1C Q6M 3/21/2018 MICROALBUMIN Q1 4/12/2018 LIPID PANEL Q1 4/12/2018 BREAST CANCER SCRN MAMMOGRAM 5/8/2018 COLONOSCOPY 1/10/2019 PAP AKA CERVICAL CYTOLOGY 4/20/2020 DTaP/Tdap/Td series (2 - Td) 12/21/2020 Allergies as of 10/2/2017  Review Complete On: 10/2/2017 By: Unique Weber LPN Severity Noted Reaction Type Reactions Pcn [Penicillins] High 07/28/2010    Anaphylaxis Clindamycin  07/28/2010    Itching SWELLING & ITCHING IN VAGINAL AREA. Current Immunizations  Reviewed on 4/15/2015 Name Date Influenza Vaccine (Quad) PF 10/2/2015 Influenza Vaccine Split 12/21/2010 Pneumococcal Vaccine (Unspecified Type) 1/1/2009 TDAP Vaccine 12/21/2010 Not reviewed this visit You Were Diagnosed With   
  
 Codes Comments Type 2 diabetes mellitus without complication, without long-term current use of insulin (HCC)    -  Primary ICD-10-CM: E11.9 ICD-9-CM: 250.00 Chronic obstructive pulmonary disease, unspecified COPD type ([facility]     ICD-10-CM: J44.9 ICD-9-CM: 729 Essential hypertension     ICD-10-CM: I10 
ICD-9-CM: 401.9 Obesity, Class II, BMI 35-39.9     ICD-10-CM: E66.9 ICD-9-CM: 278.00 Vitals BP Pulse Temp Resp Height(growth percentile) Weight(growth percentile) 134/79 (BP 1 Location: Right arm, BP Patient Position: Sitting) 76 98.7 °F (37.1 °C) (Oral) 12 5' 6\" (1.676 m) 237 lb 3.2 oz (107.6 kg) SpO2 BMI OB Status Smoking Status 96% 38.29 kg/m2 Postmenopausal Current Some Day Smoker BMI and BSA Data Body Mass Index Body Surface Area  
 38.29 kg/m 2 2.24 m 2 Preferred Pharmacy Pharmacy Name Phone [facility] [phone] [facility] [phone] Your Updated Medication List  
  
   
This list is accurate as of: 10/2/17 10:41 AM.  Always use your most recent med list.  
  
  
  
  
 albuterol 90 mcg/actuation inhaler Commonly known as:  PROAIR HFA Take 1 Puff by inhalation every four (4) hours as needed for Wheezing. fluticasone-vilanterol 200-25 mcg/dose inhaler Commonly known as:  BREO ELLIPTA Take 1 Puff by inhalation daily. furosemide 20 mg tablet Commonly known as:  LASIX Take 1 Tab by mouth daily. levalbuterol 1.25 mg/3 mL Nebu Commonly known as:  XOPENEX  
USE ONE VIAL IN NEBULIZER THREE TIMES DAILY AS NEEDED  
  
 losartan-hydroCHLOROthiazide 100-25 mg per tablet Commonly known as:  HYZAAR Take 1 Tab by mouth daily. For blood pressure Nebulizer & Compressor machine Machine and supplies for as needed nebulizer treatments. Dx: X71.0 SITagliptin-metFORMIN  mg per tablet Commonly known as:  Kiley Hopson Take 1 Tab by mouth two (2) times daily (with meals). For diabetes Prescriptions Sent to Pharmacy Refills SITagliptin-metFORMIN (JANUMET)  mg per tablet 5 Sig: Take 1 Tab by mouth two (2) times daily (with meals). For diabetes Class: Normal  
 Pharmacy: [facility] [facility] #: [phone]  Route: Oral  
 fluticasone-vilanterol (BREO ELLIPTA) 200-25 mcg/dose inhaler 5 Sig: Take 1 Puff by inhalation daily. Class: Normal  
 Pharmacy: [address] Ph #: [phone] Route: Inhalation  
 losartan-hydroCHLOROthiazide (HYZAAR) 100-25 mg per tablet 5 Sig: Take 1 Tab by mouth daily. For blood pressure Class: Normal  
 Pharmacy: [id number] Ph #: [phone] Route: Oral  
  
Follow-up Instructions Return in about 3 months (around 1/2/2018) for diabetes. Patient Instructions High Blood Pressure: Care Instructions Your Care Instructions If your blood pressure is usually above 140/90, you have high blood pressure, or hypertension. That means the top number is 140 or higher or the bottom number is 90 or higher, or both. Despite what a lot of people think, high blood pressure usually doesn't cause headaches or make you feel dizzy or lightheaded. It usually has no symptoms. But it does increase your risk for heart attack, stroke, and kidney or eye damage. The higher your blood pressure, the more your risk increases. Your doctor will give you a goal for your blood pressure. Your goal will be based on your health and your age. An example of a goal is to keep your blood pressure below 140/90. Lifestyle changes, such as eating healthy and being active, are always important to help lower blood pressure. You might also take medicine to reach your blood pressure goal. 
Follow-up care is a key part of your treatment and safety. Be sure to make and go to all appointments, and call your doctor if you are having problems. It's also a good idea to know your test results and keep a list of the medicines you take. How can you care for yourself at home? Medical treatment · If you stop taking your medicine, your blood pressure will go back up. You may take one or more types of medicine to lower your blood pressure. Be safe with medicines. Take your medicine exactly as prescribed. Call your doctor if you think you are having a problem with your medicine. · Talk to your doctor before you start taking aspirin every day. Aspirin can help certain people lower their risk of a heart attack or stroke. But taking aspirin isn't right for everyone, because it can cause serious bleeding. · See your doctor regularly. You may need to see the doctor more often at first or until your blood pressure comes down. · If you are taking blood pressure medicine, talk to your doctor before you take decongestants or anti-inflammatory medicine, such as ibuprofen. Some of these medicines can raise blood pressure. · Learn how to check your blood pressure at home. Lifestyle changes · Stay at a healthy weight. This is especially important if you put on weight around the waist. Losing even 10 pounds can help you lower your blood pressure. · If your doctor recommends it, get more exercise. Walking is a good choice. Bit by bit, increase the amount you walk every day. Try for at least 30 minutes on most days of the week. You also may want to swim, bike, or do other activities. · Avoid or limit alcohol. Talk to your doctor about whether you can drink any alcohol. · Try to limit how much sodium you eat to less than 2,300 milligrams (mg) a day. Your doctor may ask you to try to eat less than 1,500 mg a day. · Eat plenty of fruits (such as bananas and oranges), vegetables, legumes, whole grains, and low-fat dairy products. · Lower the amount of saturated fat in your diet. Saturated fat is found in animal products such as milk, cheese, and meat. Limiting these foods may help you lose weight and also lower your risk for heart disease. · Do not smoke. Smoking increases your risk for heart attack and stroke.  If you need help quitting, talk to your doctor about stop-smoking programs and medicines. These can increase your chances of quitting for good. When should you call for help? Call 911 anytime you think you may need emergency care. This may mean having symptoms that suggest that your blood pressure is causing a serious heart or blood vessel problem. Your blood pressure may be over 180/110. For example, call 911 if: 
· You have symptoms of a heart attack. These may include: ¨ Chest pain or pressure, or a strange feeling in the chest. 
¨ Sweating. ¨ Shortness of breath. ¨ Nausea or vomiting. ¨ Pain, pressure, or a strange feeling in the back, neck, jaw, or upper belly or in one or both shoulders or arms. ¨ Lightheadedness or sudden weakness. ¨ A fast or irregular heartbeat. · You have symptoms of a stroke. These may include: 
¨ Sudden numbness, tingling, weakness, or loss of movement in your face, arm, or leg, especially on only one side of your body. ¨ Sudden vision changes. ¨ Sudden trouble speaking. ¨ Sudden confusion or trouble understanding simple statements. ¨ Sudden problems with walking or balance. ¨ A sudden, severe headache that is different from past headaches. · You have severe back or belly pain. Do not wait until your blood pressure comes down on its own. Get help right away. Call your doctor now or seek immediate care if: 
· Your blood pressure is much higher than normal (such as 180/110 or higher), but you don't have symptoms. · You think high blood pressure is causing symptoms, such as: ¨ Severe headache. ¨ Blurry vision. Watch closely for changes in your health, and be sure to contact your doctor if: 
· Your blood pressure measures 140/90 or higher at least 2 times. That means the top number is 140 or higher or the bottom number is 90 or higher, or both. · You think you may be having side effects from your blood pressure medicine. · Your blood pressure is usually normal, but it goes above normal at least 2 times. Where can you learn more? Go to http://jennie-ross.info/. Enter O284 in the search box to learn more about \"High Blood Pressure: Care Instructions. \" Current as of: August 8, 2016 Content Version: 11.3 © 1890-0413 56.com. Care instructions adapted under license by [employer] (which disclaims liability or warranty for this information). If you have questions about a medical condition or this instruction, always ask your healthcare professional. [address] any warranty or liability for your use of this information. Introducing [facility] [place] [facility]! Dear Sherri Calderon: Thank you for requesting a [employer] account. Our records indicate that you already have an active [employer] account. You can access your account anytime at [email or web address] Did you know that you can access your hospital and ER discharge instructions at any time in [employer]? You can also review all of your test results from your hospital stay or ER visit. Additional Information If you have questions, please visit the Frequently Asked Questions section of the [employer] website at [email or web address] Remember, [employer] is NOT to be used for urgent needs. For medical emergencies, dial 911. Now available from your iPhone and Android! Please provide this summary of care documentation to your next provider. Your primary care clinician is listed as Amilcar Alvarez. If you have any questions after today's visit, please call [phone].

## 2017-10-02 NOTE — PROGRESS NOTES
Chief Complaint   Patient presents with    Hypertension     1. Have you been to the ER, urgent care clinic since your last visit? Hospitalized since your last visit? No    2. Have you seen or consulted any other health care providers outside of the [address] [place] [address] since your last visit? Include any pap smears or colon screening.  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

## 2017-10-02 NOTE — PROGRESS NOTES
Mayito Carter is a 62 y.o. female who was seen in clinic today (10/2/2017). Subjective:  Cardiovascular Review:  The patient has diabetes, hypertension and hyperlipidemia. Patient has sleep apnea and using CPAP nightly. Diet and Lifestyle: generally follows a low fat low cholesterol diet, generally follows a low sodium diet, does not rigorously follow a diabetic diet, sedentary, smoker 1/2 ppd  Home BP Monitoring: home readings 150/80s  Pertinent ROS: taking medications as instructed, no medication side effects noted, no TIA's, no chest pain on exertion, no dyspnea on exertion, no swelling of ankles. 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 Diabetes Mellitus:  Last A1c: 7.0% 9/2017   Diabetic ROS - medication compliance: compliant most of the time, diabetic diet compliance: noncompliant some of the time, home glucose monitoring: fasting values range 120, nonfasting values range <180, further diabetic ROS: no polyuria or polydipsia, no chest pain, dyspnea or TIA's, no numbness, tingling or pain in extremities, last eye exam approximately 1 year ago. COPD Review:  The patient is being seen for follow up of COPD, asthma. Using Breo daily and Albuterol treatments weekly. Oxygen: She currently is not on home oxygen therapy. Symptoms: chronic dyspnea: severity = moderate: course of sx: intermittent. Patient uses 1 pillows at night. Diarrhea  Patient complains of diarrhea. Onset of diarrhea was 3 weeks ago. Diarrhea is occurring approximately 6-8 times per day. Patient describes diarrhea as watery and semisolid. Patient denies significant abdominal pain, fever, blood in stool, recent antibiotic use. Evaluation to date: none. Treatment to date: fiber. Colonoscopy in 2008, follow scheduled with GI. Prior to Admission medications    Medication Sig Start Date End Date Taking?  Authorizing Provider   SITagliptin-metFORMIN (JANUMET)  mg per tablet Take 1 Tab by mouth two (2) times daily (with meals). For diabetes 10/2/17  Yes Nova Teixeira NP   fluticasone-vilanterol (BREO ELLIPTA) 200-25 mcg/dose inhaler Take 1 Puff by inhalation daily. 10/2/17  Yes Nova Teixeira NP   losartan (COZAAR) 25 mg tablet Take 1 Tab by mouth daily. For blood pressure 10/2/17  Yes Nova Teixeira NP   levalbuterol (XOPENEX) 1.25 mg/3 mL nebu USE ONE VIAL IN NEBULIZER THREE TIMES DAILY AS NEEDED 9/28/17  Yes Milla Jacobs MD   albuterol (PROAIR HFA) 90 mcg/actuation inhaler Take 1 Puff by inhalation every four (4) hours as needed for Wheezing. 7/19/17  Yes Nova Teixeira NP   furosemide (LASIX) 20 mg tablet Take 1 Tab by mouth daily. Patient taking differently: Take 20 mg by mouth as needed. 4/12/17  Yes Milla Jacobs MD   Nebulizer & Compressor machine Machine and supplies for as needed nebulizer treatments. Dx: J44.9 3/29/16  Yes Nova Teixeira NP          Allergies   Allergen Reactions    Pcn [Penicillins] Anaphylaxis    Clindamycin Itching     SWELLING & ITCHING IN VAGINAL AREA. ROS  See HPI    Objective:   Physical Exam   Constitutional: She is oriented to person, place, and time. She appears well-developed and well-nourished. Neck: Normal range of motion. Neck supple. No JVD present. Carotid bruit is not present. No thyromegaly present. Cardiovascular: Normal rate, regular rhythm and intact distal pulses. Exam reveals no gallop and no friction rub. No murmur heard. Pulmonary/Chest: Effort normal and breath sounds normal. No respiratory distress. Musculoskeletal: She exhibits no edema. Lymphadenopathy:     She has no cervical adenopathy. Neurological: She is alert and oriented to person, place, and time. Psychiatric: She has a normal mood and affect. Her behavior is normal.   Nursing note and vitals reviewed. Diabetic foot exam - no evidence of ulcerations of infection, sensation intact with filament testing.  Pedal pulse palpable. Visit Vitals    /79 (BP 1 Location: Right arm, BP Patient Position: Sitting)    Pulse 76    Temp 98.7 °F (37.1 °C) (Oral)    Resp 12    Ht 5' 6\" (1.676 m)    Wt 237 lb 3.2 oz (107.6 kg)    SpO2 96%    BMI 38.29 kg/m2       Assessment & Plan:  Diagnoses and all orders for this visit:    1. Type 2 diabetes mellitus without complication, without long-term current use of insulin (HCC)  Well controlled, no medication changes.  -     SITagliptin-metFORMIN (JANUMET)  mg per tablet; Take 1 Tab by mouth two (2) times daily (with meals). For diabetes  -      DIABETES FOOT EXAM    2. Chronic obstructive pulmonary disease, unspecified COPD type (HCC)  Stable, no changes to current therapy  -     fluticasone-vilanterol (BREO ELLIPTA) 200-25 mcg/dose inhaler; Take 1 Puff by inhalation daily. 3. Essential hypertension  -     Begin losartan 25 mg per tablet; Take 1 Tab by mouth daily. For blood pressure    4. Obesity, Class II, BMI 35-39.9  Discussed need for weight loss through diet and exercise. Reviewed decreased caloric intake and increased activity. 5. Encounter for immunization  -     PNEUMOCOCCAL CONJ VACCINE 13 VALENT IM  -     Influenza virus vaccine (QUADRIVALENT PRES FREE SYRINGE) IM ([id number])    6. Diarrhea, unspecified type  Resolving, follow up with GI as scheduled. I have discussed the diagnosis with the patient and the intended plan as seen in the above orders. The patient has received an after-visit summary along with patient information handout. I have discussed medication side effects and warnings with the patient as well. Follow-up Disposition:  Return in about 3 months (around 1/2/2018) for diabetes.         Parul Jose NP

## 2017-10-18 ENCOUNTER — OFFICE VISIT (OUTPATIENT)
Dept: FAMILY MEDICINE CLINIC | Age: 57
End: 2017-10-18

## 2017-10-18 VITALS
WEIGHT: 237.2 LBS | BODY MASS INDEX: 38.12 KG/M2 | TEMPERATURE: 98.5 F | DIASTOLIC BLOOD PRESSURE: 80 MMHG | HEART RATE: 90 BPM | RESPIRATION RATE: 18 BRPM | HEIGHT: 66 IN | SYSTOLIC BLOOD PRESSURE: 149 MMHG | OXYGEN SATURATION: 93 %

## 2017-10-18 DIAGNOSIS — F17.200 TOBACCO USE DISORDER: ICD-10-CM

## 2017-10-18 DIAGNOSIS — R06.02 SOB (SHORTNESS OF BREATH): ICD-10-CM

## 2017-10-18 DIAGNOSIS — R07.89 CHEST DISCOMFORT: Primary | ICD-10-CM

## 2017-10-18 DIAGNOSIS — J44.9 CHRONIC OBSTRUCTIVE PULMONARY DISEASE, UNSPECIFIED COPD TYPE (HCC): ICD-10-CM

## 2017-10-18 DIAGNOSIS — E66.9 OBESITY, CLASS II, BMI 35-39.9: ICD-10-CM

## 2017-10-18 RX ORDER — PREDNISONE 20 MG/1
TABLET ORAL
Qty: 21 TAB | Refills: 0 | Status: SHIPPED | OUTPATIENT
Start: 2017-10-18 | End: 2017-11-05

## 2017-10-18 NOTE — PROGRESS NOTES
HISTORY OF PRESENT ILLNESS  Connor Silverman is a 62 y.o. female. Blood pressure 149/80, pulse 90, temperature 98.5 °F (36.9 °C), temperature source Oral, resp. rate 18, height 5' 6\" (1.676 m), weight 237 lb 3.2 oz (107.6 kg), SpO2 93 %. Body mass index is 38.29 kg/(m^2). Chief Complaint   Patient presents with    Shortness of Breath     x 2 weeks        HPI  Poonam Teresa 62 y.o. female  presents to the office today for shortness of breath. Shortness of breath and chest pain: Pt reports that she has had shortness of breath for the past couple days. Pt reports that she is still smoking. 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 Pt states that she is not allowed to wear a mask at work. Pt states that she feels like she is more sensitive to smells. Pt reports non radiating, intermittent chest pain. Pt reports that when she gets the chest pain it does not last a long time. Pt reports that she feels a heavy pressure on her chest this morning, but is better currently. Pt states that her SOB is exacerbated when she has the chest pain. Pt reports that she uses her inhaler with minimal relief. Pt denies and nausea, vomiting, or sweating. Pt reports that her father had heart disease. Pt denies any family history of MI. Pt reports that her father had a stroke. I advised pt that we need to get an EKG to rule out any cardiac abnormalities. I advised pt that it could be angina. Pt's EKG came back unremarkable. Pt states that her SOB is worse going up stairs. Will give a breathing treatment today in office. Pt still has wheezing and coarse breath sounds after treatment. Will start pt on Prednisone to take as directed. Advised pt to take some time off work because the fumes are aggravating her SOB. Current Outpatient Prescriptions   Medication Sig Dispense Refill    predniSONE (DELTASONE) 20 mg tablet Take 3 pills for 3 days, 2 pills for 3 days, 1 pill for 3 days. 1/2 pill for 4 days 21 Tab 0    SITagliptin-metFORMIN (JANUMET)  mg per tablet Take 1 Tab by mouth two (2) times daily (with meals). For diabetes 60 Tab 5    fluticasone-vilanterol (BREO ELLIPTA) 200-25 mcg/dose inhaler Take 1 Puff by inhalation daily. 1 Inhaler 5    losartan (COZAAR) 25 mg tablet Take 1 Tab by mouth daily. For blood pressure 90 Tab 1    levalbuterol (XOPENEX) 1.25 mg/3 mL nebu USE ONE VIAL IN NEBULIZER THREE TIMES DAILY AS NEEDED 75 Nebule 5    albuterol (PROAIR HFA) 90 mcg/actuation inhaler Take 1 Puff by inhalation every four (4) hours as needed for Wheezing. 1 Inhaler 5    furosemide (LASIX) 20 mg tablet Take 1 Tab by mouth daily. (Patient taking differently: Take 20 mg by mouth as needed.) 30 Tab 5    Nebulizer & Compressor machine Machine and supplies for as needed nebulizer treatments. Dx: J44.9 1 Each 0    umeclidinium (INCRUSE ELLIPTA) 62.5 mcg/actuation inhaler Take 1 Puff by inhalation daily. Indications: PREVENTION OF BRONCHOSPASMS WITH EMPHYSEMA 1 Inhaler 5     Allergies   Allergen Reactions    Pcn [Penicillins] Anaphylaxis    Clindamycin Itching     SWELLING & ITCHING IN VAGINAL AREA. Past Medical History:   Diagnosis Date    Anxiety     Arthralgia     Asthma     Asthma     Chronic obstructive pulmonary disease ([address]     PFT 8/2015    Crack cocaine use     quit; used for 8 years    Diabetes ([address]     Hemorrhoid     Psychiatric disorder     ANXIETY.  Sleep apnea, obstructive 2016    Tobacco use      Past Surgical History:   Procedure Laterality Date    ENDOSCOPY, COLON, DIAGNOSTIC  2008    F/U 5 years Dr. Nina Thornwood HX GI      COLONOSCOPY X 1    HX HEENT      WISDOM TEETH EXTRACTION.      Family History   Problem Relation Age of Onset    Cancer Mother      colon    Diabetes Father     Hypertension Father      Social History   Substance Use Topics    Smoking status: Current Some Day Smoker     Packs/day: 0.25     Years: 22.00     Types: Cigarettes    Smokeless tobacco: Never Used      Comment: smokes about 5 cigarettes/day    Alcohol use No        Review of Systems   Constitutional: Negative. Negative for diaphoresis and malaise/fatigue. Eyes: Negative for blurred vision. Respiratory: Positive for shortness of breath. Cardiovascular: Positive for chest pain (intermittent). Negative for leg swelling. Gastrointestinal: Negative for nausea and vomiting. Musculoskeletal: Negative. Neurological: Negative. Negative for dizziness and headaches. All other systems reviewed and are negative. Physical Exam   Constitutional: She is oriented to person, place, and time. She appears well-developed and well-nourished. No distress. HENT:   Head: Normocephalic and atraumatic. Neck: Carotid bruit is not present. Cardiovascular: Normal rate, regular rhythm, normal heart sounds and intact distal pulses. Exam reveals no gallop and no friction rub. No murmur heard. Pulmonary/Chest: She has wheezes. Wheezes bilaterally with coarse breath sounds bilaterally. Musculoskeletal: She exhibits no edema. Neurological: She is alert and oriented to person, place, and time. Skin: She is not diaphoretic. Psychiatric: She has a normal mood and affect. Her behavior is normal. Judgment and thought content normal.   Nursing note and vitals reviewed. ASSESSMENT and PLAN  Diagnoses and all orders for this visit:    1. Chest discomfort  -     AMB POC EKG ROUTINE W/ 12 LEADS, INTER & REP  - I advised pt that we need to get an EKG to rule out any cardiac abnormalities. I advised pt that it could be angina.   - Pt's EKG came back unremarkable. Will give a breathing treatment today in office.   - Pt still has wheezing and coarse breath sounds after treatment. Will start pt on Prednisone to take as directed. Advised pt to take some time off work because the fumes are aggravating her SOB.      2. Chronic obstructive pulmonary disease, unspecified COPD type ([facility]  -     predniSONE (DELTASONE) 20 mg tablet; Take 3 pills for 3 days, 2 pills for 3 days, 1 pill for 3 days. 1/2 pill for 4 days  - See above. 3. SOB (shortness of breath)         - See above. 4. Tobacco use disorder         - The patient was counseled on the dangers of tobacco use, and was advised to quit. Reviewed strategies to maximize success, including removing cigarettes and smoking materials from environment. 5. Obesity, Class II, BMI 35-39.9        - I have reviewed/discussed the above normal BMI with the patient. I have recommended the following interventions: dietary management education, guidance, and counseling, encourage exercise and monitor weight . Follow-up Disposition:  Return in about 5 days (around 10/23/2017) for COPD. Medication risks/benefits/costs/interactions/alternatives discussed with patient. Advised patient to call back or return to office if symptoms worsen/change/persist.  If patient cannot reach us or should anything more severe/urgent arise he/she should proceed directly to the nearest emergency department. Discussed expected course/resolution/complications of diagnosis in detail with patient. Patient given a written after visit summary which includes her diagnoses, current medications and vitals. Patient expressed understanding with the diagnosis and plan. Written by susu Anne, as dictated by Shantel Perez M.D.         I have reviewed and agree with the above note and have made corrections where appropriate, Dr. Lydia Moe MD

## 2017-10-18 NOTE — PROGRESS NOTES
Tiffanie Poon is a 62 y.o. female  Chief Complaint   Patient presents with    Shortness of Breath     x 2 weeks     1. Have you been to the ER, urgent care clinic since your last visit? Hospitalized since your last visit? No    2. Have you seen or consulted any other health care providers outside of the [address] [place] [address] since your last visit? Include any pap smears or colon screening.  No

## 2017-10-18 NOTE — MR AVS SNAPSHOT
Visit Information Date & Time Provider Department Dept. Phone Encounter #  
 10/18/2017  1:45 PM Kristen Workman  [address] [phone] [id number] Follow-up Instructions Return in about 5 days (around 10/23/2017) for COPD. Your Appointments 1/8/2018 10:00 AM  
ROUTINE CARE with Kristen Workman MD  
[facility]) Appt Note: 3 months follow up visit DM  
 [address] [phone]  
[phone]  
  
   
 [address] [address] Upcoming Health Maintenance Date Due  
 EYE EXAM RETINAL OR DILATED Q1 7/21/2015 HEMOGLOBIN A1C Q6M 3/21/2018 MICROALBUMIN Q1 4/12/2018 LIPID PANEL Q1 4/12/2018 BREAST CANCER SCRN MAMMOGRAM 5/8/2018 FOOT EXAM Q1 10/2/2018 COLONOSCOPY 1/10/2019 PAP AKA CERVICAL CYTOLOGY 4/20/2020 DTaP/Tdap/Td series (2 - Td) 12/21/2020 Allergies as of 10/18/2017  Review Complete On: 10/18/2017 By: Kristen Workman MD  
  
 Severity Noted Reaction Type Reactions Pcn [Penicillins] High 07/28/2010    Anaphylaxis Clindamycin  07/28/2010    Itching SWELLING & ITCHING IN VAGINAL AREA. Current Immunizations  Reviewed on 10/2/2017 Name Date Influenza Vaccine (Quad) PF 10/2/2017, 10/2/2015 Influenza Vaccine Split 12/21/2010 Pneumococcal Conjugate (PCV-13) 10/2/2017 Pneumococcal Vaccine (Unspecified Type) 1/1/2009 TDAP Vaccine 12/21/2010 Not reviewed this visit You Were Diagnosed With   
  
 Codes Comments Chest discomfort    -  Primary ICD-10-CM: R07.89 ICD-9-CM: 786.59 Chronic obstructive pulmonary disease, unspecified COPD type ([facility]     ICD-10-CM: J44.9 ICD-9-CM: 737 SOB (shortness of breath)     ICD-10-CM: R06.02 
ICD-9-CM: 786.05 Tobacco use disorder     ICD-10-CM: F17.200 ICD-9-CM: 305.1 Obesity, Class II, BMI 35-39.9     ICD-10-CM: E66.9 ICD-9-CM: 278.00 Vitals BP Pulse Temp Resp Height(growth percentile) Weight(growth percentile) 149/80 (BP 1 Location: Left arm, BP Patient Position: Sitting) 90 98.5 °F (36.9 °C) (Oral) 18 5' 6\" (1.676 m) 237 lb 3.2 oz (107.6 kg) SpO2 BMI OB Status Smoking Status 93% 38.29 kg/m2 Postmenopausal Current Some Day Smoker Vitals History BMI and BSA Data Body Mass Index Body Surface Area  
 38.29 kg/m 2 2.24 m 2 Preferred Pharmacy Pharmacy Name Phone [phone] [facility] [phone] Your Updated Medication List  
  
   
This list is accurate as of: 10/18/17  3:07 PM.  Always use your most recent med list.  
  
  
  
  
 albuterol 90 mcg/actuation inhaler Commonly known as:  PROAIR HFA Take 1 Puff by inhalation every four (4) hours as needed for Wheezing. fluticasone-vilanterol 200-25 mcg/dose inhaler Commonly known as:  BREO ELLIPTA Take 1 Puff by inhalation daily. furosemide 20 mg tablet Commonly known as:  LASIX Take 1 Tab by mouth daily. levalbuterol 1.25 mg/3 mL Nebu Commonly known as:  XOPENEX  
USE ONE VIAL IN NEBULIZER THREE TIMES DAILY AS NEEDED  
  
 losartan 25 mg tablet Commonly known as:  COZAAR Take 1 Tab by mouth daily. For blood pressure Nebulizer & Compressor machine Machine and supplies for as needed nebulizer treatments. Dx: J44.9  
  
 predniSONE 20 mg tablet Commonly known as:  Nanine Knife Take 3 pills for 3 days, 2 pills for 3 days, 1 pill for 3 days. 1/2 pill for 4 days SITagliptin-metFORMIN  mg per tablet Commonly known as:  Lele Chidi Take 1 Tab by mouth two (2) times daily (with meals). For diabetes Prescriptions Sent to Pharmacy Refills  
 predniSONE (DELTASONE) 20 mg tablet 0 Sig: Take 3 pills for 3 days, 2 pills for 3 days, 1 pill for 3 days. 1/2 pill for 4 days  Class: Normal  
 Pharmacy: [address] [facility] #: [phone] We Performed the Following AMB POC EKG ROUTINE W/ 12 LEADS, INTER & REP [99444 CPT(R)] Follow-up Instructions Return in about 5 days (around 10/23/2017) for COPD. Introducing [facility]! Dear Millicent Carnes: Thank you for requesting a [employer] account. Our records indicate that you already have an active [employer] account. You can access your account anytime at [email or web address] Did you know that you can access your hospital and ER discharge instructions at any time in [employer]? You can also review all of your test results from your hospital stay or ER visit. Additional Information If you have questions, please visit the Frequently Asked Questions section of the [employer] website at [email or web address] Remember, [employer] is NOT to be used for urgent needs. For medical emergencies, dial 911. Now available from your iPhone and Android! Please provide this summary of care documentation to your next provider. Your primary care clinician is listed as Alvaro Chavira. If you have any questions after today's visit, please call [phone].

## 2017-10-18 NOTE — LETTER
NOTIFICATION RETURN TO WORK  
 
10/18/2017 3:02 PM 
 
Ms. Castro Wong [address] [facility] [address] To Whom It May Concern: 
 
Castro Wong is currently under the care of CHARLIE Blanton. She will return to work l on: 10/25/17 If there are questions or concerns please have the patient contact our office. Sincerely, Ran Campos MD

## 2017-10-23 ENCOUNTER — OFFICE VISIT (OUTPATIENT)
Dept: FAMILY MEDICINE CLINIC | Age: 57
End: 2017-10-23

## 2017-10-23 VITALS
SYSTOLIC BLOOD PRESSURE: 138 MMHG | WEIGHT: 238.2 LBS | OXYGEN SATURATION: 97 % | TEMPERATURE: 97.9 F | HEIGHT: 66 IN | RESPIRATION RATE: 18 BRPM | HEART RATE: 73 BPM | DIASTOLIC BLOOD PRESSURE: 88 MMHG | BODY MASS INDEX: 38.28 KG/M2

## 2017-10-23 DIAGNOSIS — R06.02 SOB (SHORTNESS OF BREATH): ICD-10-CM

## 2017-10-23 DIAGNOSIS — J44.1 CHRONIC OBSTRUCTIVE PULMONARY DISEASE WITH ACUTE EXACERBATION (HCC): Primary | ICD-10-CM

## 2017-10-23 RX ORDER — TRIAMCINOLONE ACETONIDE 40 MG/ML
40 INJECTION, SUSPENSION INTRA-ARTICULAR; INTRAMUSCULAR ONCE
Qty: 1 ML | Refills: 0
Start: 2017-10-23 | End: 2017-10-23

## 2017-10-23 NOTE — LETTER
NOTIFICATION RETURN TO WORK  
 
10/23/2017 6:14 PM 
 
Ms. Mayito Carter [address] [address] To Whom It May Concern: 
 
Mayito Carter is currently under the care of CHARLIE Blanton. She will return to work on: 11/3/17 If there are questions or concerns please have the patient contact our office. Sincerely, Maura Halsted, MD

## 2017-10-23 NOTE — PROGRESS NOTES
Chief Complaint   Patient presents with    COPD     follow up       Reviewed Record in preparation for visit and have obtained necessary documentation. Identified pt with two pt identifiers (Name @ )    Health Maintenance Due   Topic    EYE EXAM RETINAL OR DILATED Q1          1. Have you been to the ER, urgent care clinic since your last visit? Hospitalized since your last visit? No    2. Have you seen or consulted any other health care providers outside of the [address] [place] [address] since your last visit? Include any pap smears or colon screening.  No

## 2017-10-23 NOTE — PROGRESS NOTES
HISTORY OF PRESENT ILLNESS  Delisa Terrazas is a 62 y.o. female. Blood pressure 138/88, pulse 73, temperature 97.9 °F (36.6 °C), temperature source Oral, resp. rate 18, height 5' 6\" (1.676 m), weight 238 lb 3.2 oz (108 kg), SpO2 97 %. Body mass index is 38.45 kg/(m^2). Chief Complaint   Patient presents with    COPD     follow up        HPI  Poonam Terrazas 62 y.o. female  presents to the office today for COPD follow up. Hypertension: Bp at office today 138/88 by manual arm cuff recheck. Pt continues with Losartan 25mg daily. Pt reports that she has not drank a lot of water today. Advised pt that she needs to stay well hydrated. COPD: Pt reports that she is SOB and is very tired. Advised pt that we need to get a CXR today after a breathing treatment. Pt's O2 sat was 85% while walking to XR. Pt still has wheezes after nebulizer treatment. Pt's XR came back unremarkable. Pt states that she thinks the Prednisone is helping and does not feel like she is at the point where she needs to be admitted to the hospital. Pt states she still has productive cough with dark mucus. I advised pt that I will give her an injection of Kenalog to help open things up, to continue with the Prednisone, and start on Incruse inhaler. Advised pt that she will also need to fill out some FMLA forms. Pt reports that her FMLA forms are good until January. Pt states that she is supposed to go back in 2 days, but I advised pt to stay out until next Friday until she starts feeling better. Will follow up in 1 week and if things have not improved I advised pt that she may need to be admitted to the hospital. Advised pt that she needs to quit smoking. Current Outpatient Prescriptions   Medication Sig Dispense Refill    triamcinolone acetonide (KENALOG) 40 mg/mL injection 1 mL by IntraMUSCular route once for 1 dose. 1 mL 0    umeclidinium (INCRUSE ELLIPTA) 62.5 mcg/actuation inhaler Take 1 Puff by inhalation daily.  Indications: PREVENTION OF BRONCHOSPASMS WITH EMPHYSEMA 1 Inhaler 5    predniSONE (DELTASONE) 20 mg tablet Take 3 pills for 3 days, 2 pills for 3 days, 1 pill for 3 days. 1/2 pill for 4 days 21 Tab 0    SITagliptin-metFORMIN (JANUMET)  mg per tablet Take 1 Tab by mouth two (2) times daily (with meals). For diabetes 60 Tab 5    fluticasone-vilanterol (BREO ELLIPTA) 200-25 mcg/dose inhaler Take 1 Puff by inhalation daily. 1 Inhaler 5    losartan (COZAAR) 25 mg tablet Take 1 Tab by mouth daily. For blood pressure 90 Tab 1    levalbuterol (XOPENEX) 1.25 mg/3 mL nebu USE ONE VIAL IN NEBULIZER THREE TIMES DAILY AS NEEDED 75 Nebule 5    albuterol (PROAIR HFA) 90 mcg/actuation inhaler Take 1 Puff by inhalation every four (4) hours as needed for Wheezing. 1 Inhaler 5    furosemide (LASIX) 20 mg tablet Take 1 Tab by mouth daily. (Patient taking differently: Take 20 mg by mouth as needed.) 30 Tab 5    Nebulizer & Compressor machine Machine and supplies for as needed nebulizer treatments. Dx: J44.9 1 Each 0     Allergies   Allergen Reactions    Pcn [Penicillins] Anaphylaxis    Clindamycin Itching     SWELLING & ITCHING IN VAGINAL AREA. Past Medical History:   Diagnosis Date    Anxiety     Arthralgia     Asthma     Asthma     Chronic obstructive pulmonary disease ([facility]     PFT 8/2015    Crack cocaine use     quit; used for 8 years    Diabetes ([facility]     Hemorrhoid     Psychiatric disorder     ANXIETY.  Sleep apnea, obstructive 2016    Tobacco use      Past Surgical History:   Procedure Laterality Date    ENDOSCOPY, COLON, DIAGNOSTIC  2008    F/U 5 years Dr. Brigitte Jimenez HX GI      COLONOSCOPY X 1    HX HEENT      WISDOM TEETH EXTRACTION.      Family History   Problem Relation Age of Onset    Cancer Mother      colon    Diabetes Father     Hypertension Father      Social History   Substance Use Topics    Smoking status: Current Some Day Smoker     Packs/day: 0.25     Years: 22.00 Types: Cigarettes    Smokeless tobacco: Never Used      Comment: smokes about 5 cigarettes/day    Alcohol use No        Review of Systems   Constitutional: Positive for malaise/fatigue. Eyes: Negative for blurred vision. Respiratory: Positive for shortness of breath. Cardiovascular: Negative for chest pain and leg swelling. Musculoskeletal: Negative. Neurological: Negative. Negative for dizziness and headaches. All other systems reviewed and are negative. Physical Exam   Constitutional: She is oriented to person, place, and time. She appears well-developed and well-nourished. No distress. HENT:   Head: Normocephalic and atraumatic. Neck: Carotid bruit is not present. Cardiovascular: Normal rate, regular rhythm, normal heart sounds and intact distal pulses. Exam reveals no gallop and no friction rub. No murmur heard. Pulmonary/Chest: No respiratory distress. She has wheezes (scattered expiratory ). She has no rales. Musculoskeletal: She exhibits no edema. Neurological: She is alert and oriented to person, place, and time. Skin: She is not diaphoretic. Psychiatric: She has a normal mood and affect. Her behavior is normal. Judgment and thought content normal.   Nursing note and vitals reviewed. ASSESSMENT and PLAN  Diagnoses and all orders for this visit:    1. Chronic obstructive pulmonary disease, acute exacerbation (HCC)  -     XR CHEST PA LAT; Future  -     TRIAMCINOLONE ACETONIDE INJ  -     triamcinolone acetonide (KENALOG) 40 mg/mL injection; 1 mL by IntraMUSCular route once for 1 dose. -     umeclidinium (INCRUSE ELLIPTA) 62.5 mcg/actuation inhaler; Take 1 Puff by inhalation daily. Indications: PREVENTION OF BRONCHOSPASMS WITH EMPHYSEMA  - Advised pt that we need to get a CXR today after a breathing treatment. Pt's O2 sat was 85% while walking to XR. Pt still has wheezes after nebulizer treatment. Pt's XR came back unremarkable.  I advised pt that I will give her an injection of Kenalog to help open things up, to continue with the Prednisone, and start on Incruse inhaler. Advised pt that she will also need to fill out some FMLA forms. Pt reports that her FMLA forms are good until January. Pt states that she is supposed to go back in 2 days, but I advised pt to stay out until next Friday until she starts feeling better. Will follow up in 1 week and if things have not improved I advised pt that she may need to be admitted to the hospital. Advised pt that she needs to quit smoking. 2. SOB (shortness of breath)  -     XR CHEST PA LAT; Future  - See above. Follow-up Disposition:  Return in about 9 days (around 11/1/2017) for COPD follow up. Medication risks/benefits/costs/interactions/alternatives discussed with patient. Advised patient to call back or return to office if symptoms worsen/change/persist.  If patient cannot reach us or should anything more severe/urgent arise he/she should proceed directly to the nearest emergency department. Discussed expected course/resolution/complications of diagnosis in detail with patient. Patient given a written after visit summary which includes her diagnoses, current medications and vitals. Patient expressed understanding with the diagnosis and plan.   Written by Tara William, as dictated by, Nigel Suggs MD.       I have reviewed and agree with the above note and have made corrections where appropriate, Dr. Nigel Suggs MD

## 2017-10-23 NOTE — MR AVS SNAPSHOT
Visit Information Date & Time Provider Department Dept. Phone Encounter #  
 10/23/2017  4:30 PM Ozzy Oliver  [facility] [phone] [id number] Follow-up Instructions Return in about 9 days (around 11/1/2017) for COPD follow up. Your Appointments 11/1/2017  6:30 PM  
ROUTINE CARE with Ozzy Oliver MD  
[facility]) Appt Note: 9 days follow up for COPD  
 [address] [facility] [address]  
[phone]  
  
   
 [address] [facility] [address]  
  
    
 1/8/2018 10:00 AM  
ROUTINE CARE with Ozzy Oliver MD  
[address] [facility] ([address]) Appt Note: 3 months follow up visit DM  
 [address] [facility]  
[phone] Upcoming Health Maintenance Date Due  
 EYE EXAM RETINAL OR DILATED Q1 7/21/2015 HEMOGLOBIN A1C Q6M 3/21/2018 MICROALBUMIN Q1 4/12/2018 LIPID PANEL Q1 4/12/2018 BREAST CANCER SCRN MAMMOGRAM 5/8/2018 FOOT EXAM Q1 10/2/2018 COLONOSCOPY 1/10/2019 PAP AKA CERVICAL CYTOLOGY 4/20/2020 DTaP/Tdap/Td series (2 - Td) 12/21/2020 Allergies as of 10/23/2017  Review Complete On: 10/23/2017 By: Ozzy Oliver MD  
  
 Severity Noted Reaction Type Reactions Pcn [Penicillins] High 07/28/2010    Anaphylaxis Clindamycin  07/28/2010    Itching SWELLING & ITCHING IN VAGINAL AREA. Current Immunizations  Reviewed on 10/2/2017 Name Date Influenza Vaccine (Quad) PF 10/2/2017, 10/2/2015 Influenza Vaccine Split 12/21/2010 Pneumococcal Conjugate (PCV-13) 10/2/2017 Pneumococcal Vaccine (Unspecified Type) 1/1/2009 TDAP Vaccine 12/21/2010 Not reviewed this visit You Were Diagnosed With   
  
 Codes Comments Chronic obstructive pulmonary disease, unspecified COPD type ([facility]    -  Primary ICD-10-CM: J44.9 ICD-9-CM: 875  SOB (shortness of breath)     ICD-10-CM: R06.02 
 ICD-9-CM: 786.05 Vitals BP Pulse Temp Resp Height(growth percentile) Weight(growth percentile) 138/88 73 97.9 °F (36.6 °C) (Oral) 18 5' 6\" (1.676 m) 238 lb 3.2 oz (108 kg) SpO2 BMI OB Status Smoking Status 97% 38.45 kg/m2 Postmenopausal Current Some Day Smoker Vitals History BMI and BSA Data Body Mass Index Body Surface Area  
 38.45 kg/m 2 2.24 m 2 Preferred Pharmacy Pharmacy Name Phone [phone] [facility] [phone] Your Updated Medication List  
  
   
This list is accurate as of: 10/23/17  6:20 PM.  Always use your most recent med list.  
  
  
  
  
 albuterol 90 mcg/actuation inhaler Commonly known as:  PROAIR HFA Take 1 Puff by inhalation every four (4) hours as needed for Wheezing. fluticasone-vilanterol 200-25 mcg/dose inhaler Commonly known as:  BREO ELLIPTA Take 1 Puff by inhalation daily. furosemide 20 mg tablet Commonly known as:  LASIX Take 1 Tab by mouth daily. levalbuterol 1.25 mg/3 mL Nebu Commonly known as:  XOPENEX  
USE ONE VIAL IN NEBULIZER THREE TIMES DAILY AS NEEDED  
  
 losartan 25 mg tablet Commonly known as:  COZAAR Take 1 Tab by mouth daily. For blood pressure Nebulizer & Compressor machine Machine and supplies for as needed nebulizer treatments. Dx: J44.9  
  
 predniSONE 20 mg tablet Commonly known as:  Nanine Knife Take 3 pills for 3 days, 2 pills for 3 days, 1 pill for 3 days. 1/2 pill for 4 days SITagliptin-metFORMIN  mg per tablet Commonly known as:  Lele Chidi Take 1 Tab by mouth two (2) times daily (with meals). For diabetes  
  
 triamcinolone acetonide 40 mg/mL injection Commonly known as:  KENALOG  
1 mL by IntraMUSCular route once for 1 dose. umeclidinium 62.5 mcg/actuation inhaler Commonly known as:  INCRUSE ELLIPTA Take 1 Puff by inhalation daily.  Indications: PREVENTION OF BRONCHOSPASMS WITH EMPHYSEMA Prescriptions Sent to Pharmacy Refills  
 umeclidinium (INCRUSE ELLIPTA) 62.5 mcg/actuation inhaler 5 Sig: Take 1 Puff by inhalation daily. Indications: PREVENTION OF BRONCHOSPASMS WITH EMPHYSEMA Class: Normal  
 Pharmacy: [address] [facility] [address] [facility] Ph #: [phone] Route: Inhalation We Performed the Following TRIAMCINOLONE ACETONIDE INJ [ [facility] [place] Follow-up Instructions Return in about 9 days (around 11/1/2017) for COPD follow up. To-Do List   
 10/23/2017 Imaging:  XR CHEST PA LAT Introducing hospitals & [facility] SERVICES! Dear Eugenie Nicely: Thank you for requesting a [employer] account. Our records indicate that you already have an active [employer] account. You can access your account anytime at [email or web address]. Continuing Education Records & Resources/[employer] Did you know that you can access your hospital and ER discharge instructions at any time in [employer]? You can also review all of your test results from your hospital stay or ER visit. Additional Information If you have questions, please visit the Frequently Asked Questions section of the [employer] website at [email or web address]. Continuing Education Records & Resources/[employer]/. Remember, [employer] is NOT to be used for urgent needs. For medical emergencies, dial 911. Now available from your iPhone and Android! Please provide this summary of care documentation to your next provider. Your primary care clinician is listed as Cedric Zavala. If you have any questions after today's visit, please call [phone].

## 2017-11-01 ENCOUNTER — OFFICE VISIT (OUTPATIENT)
Dept: FAMILY MEDICINE CLINIC | Age: 57
End: 2017-11-01

## 2017-11-01 VITALS
DIASTOLIC BLOOD PRESSURE: 80 MMHG | WEIGHT: 239.4 LBS | TEMPERATURE: 97.8 F | OXYGEN SATURATION: 95 % | HEIGHT: 66 IN | RESPIRATION RATE: 16 BRPM | HEART RATE: 85 BPM | BODY MASS INDEX: 38.47 KG/M2 | SYSTOLIC BLOOD PRESSURE: 130 MMHG

## 2017-11-01 DIAGNOSIS — F17.200 TOBACCO USE DISORDER: ICD-10-CM

## 2017-11-01 DIAGNOSIS — J06.9 UPPER RESPIRATORY TRACT INFECTION, UNSPECIFIED TYPE: ICD-10-CM

## 2017-11-01 DIAGNOSIS — E66.9 OBESITY, CLASS II, BMI 35-39.9: ICD-10-CM

## 2017-11-01 DIAGNOSIS — J44.9 CHRONIC OBSTRUCTIVE PULMONARY DISEASE, UNSPECIFIED COPD TYPE (HCC): Primary | ICD-10-CM

## 2017-11-01 DIAGNOSIS — R05.9 COUGH: ICD-10-CM

## 2017-11-01 DIAGNOSIS — R59.9 ADENOPATHY: ICD-10-CM

## 2017-11-01 DIAGNOSIS — R91.8 GROUND GLASS OPACITY PRESENT ON IMAGING OF LUNG: ICD-10-CM

## 2017-11-01 RX ORDER — CODEINE PHOSPHATE AND GUAIFENESIN 10; 100 MG/5ML; MG/5ML
5 SOLUTION ORAL
Qty: 120 ML | Refills: 0 | Status: SHIPPED | OUTPATIENT
Start: 2017-11-01 | End: 2018-01-08 | Stop reason: ALTCHOICE

## 2017-11-01 RX ORDER — DOXYCYCLINE 100 MG/1
100 TABLET ORAL 2 TIMES DAILY
Qty: 20 TAB | Refills: 0 | Status: SHIPPED | OUTPATIENT
Start: 2017-11-01 | End: 2017-11-11

## 2017-11-01 NOTE — PROGRESS NOTES
Chief Complaint   Patient presents with    Follow-up     copd    Cold Symptoms     sore throat x 2 days , sneezing      1. Have you been to the ER, urgent care clinic since your last visit? Hospitalized since your last visit? No    2. Have you seen or consulted any other health care providers outside of the [facility] since your last visit? Include any pap smears or colon screening.  No

## 2017-11-01 NOTE — LETTER
NOTIFICATION RETURN TO WORK  
 
11/1/2017 7:49 PM 
 
Ms. Casandra Parson [address] [address] To Whom It May Concern: 
 
Casandra Parson is currently under the care of CHARLIE Blanton. She will return to work on: 11/24/17 If there are questions or concerns please have the patient contact our office. Sincerely, Milla Jacobs MD

## 2017-11-01 NOTE — MR AVS SNAPSHOT
Visit Information Date & Time Provider Department Dept. Phone Encounter #  
 11/1/2017  6:30 PM Britt Dudley MD [address] [place] [address] [phone] [id number] Follow-up Instructions Return in about 3 weeks (around 11/22/2017) for COPD. Your Appointments 1/8/2018 10:00 AM  
ROUTINE CARE with Britt Dudley MD  
[facility]) Appt Note: 3 months follow up visit DM  
 [address] [address]  
[phone]  
  
   
 [address] [address] Upcoming Health Maintenance Date Due  
 EYE EXAM RETINAL OR DILATED Q1 7/21/2015 HEMOGLOBIN A1C Q6M 3/21/2018 MICROALBUMIN Q1 4/12/2018 LIPID PANEL Q1 4/12/2018 BREAST CANCER SCRN MAMMOGRAM 5/8/2018 FOOT EXAM Q1 10/2/2018 COLONOSCOPY 1/10/2019 PAP AKA CERVICAL CYTOLOGY 4/20/2020 DTaP/Tdap/Td series (2 - Td) 12/21/2020 Allergies as of 11/1/2017  Review Complete On: 11/1/2017 By: Britt Dudley MD  
  
 Severity Noted Reaction Type Reactions Pcn [Penicillins] High 07/28/2010    Anaphylaxis Clindamycin  07/28/2010    Itching SWELLING & ITCHING IN VAGINAL AREA. Current Immunizations  Reviewed on 10/2/2017 Name Date Influenza Vaccine (Quad) PF 10/2/2017, 10/2/2015 Influenza Vaccine Split 12/21/2010 Pneumococcal Conjugate (PCV-13) 10/2/2017 Pneumococcal Vaccine (Unspecified Type) 1/1/2009 TDAP Vaccine 12/21/2010 Not reviewed this visit You Were Diagnosed With   
  
 Codes Comments Chronic obstructive pulmonary disease, unspecified COPD type ([facility]    -  Primary ICD-10-CM: J44.9 ICD-9-CM: 884 Ground glass opacity present on imaging of lung     ICD-10-CM: R91.8 ICD-9-CM: 793.19 Adenopathy     ICD-10-CM: R59.1 ICD-9-CM: 552. 6 Cough     ICD-10-CM: R05 ICD-9-CM: 172. 2 Upper respiratory tract infection, unspecified type     ICD-10-CM: J06.9 ICD-9-CM: 465.9 Obesity, Class II, BMI 35-39.9     ICD-10-CM: E66.9 ICD-9-CM: 278.00 Tobacco use disorder     ICD-10-CM: F17.200 ICD-9-CM: 305.1 Vitals BP Pulse Temp Resp Height(growth percentile) Weight(growth percentile) 130/80 85 97.8 °F (36.6 °C) (Oral) 16 5' 6\" (1.676 m) 239 lb 6.4 oz (108.6 kg) SpO2 BMI OB Status Smoking Status 95% 38.64 kg/m2 Postmenopausal Current Some Day Smoker Vitals History BMI and BSA Data Body Mass Index Body Surface Area  
 38.64 kg/m 2 2.25 m 2 Preferred Pharmacy Pharmacy Name Phone [facility] [phone] [facility] [phone] Your Updated Medication List  
  
   
This list is accurate as of: 11/1/17  7:51 PM.  Always use your most recent med list.  
  
  
  
  
 albuterol 90 mcg/actuation inhaler Commonly known as:  PROAIR HFA Take 1 Puff by inhalation every four (4) hours as needed for Wheezing. doxycycline 100 mg tablet Commonly known as:  ADOXA Take 1 Tab by mouth two (2) times a day for 10 days. fluticasone-vilanterol 200-25 mcg/dose inhaler Commonly known as:  BREO ELLIPTA Take 1 Puff by inhalation daily. furosemide 20 mg tablet Commonly known as:  LASIX Take 1 Tab by mouth daily. guaiFENesin-codeine 100-10 mg/5 mL solution Commonly known as:  ROBITUSSIN AC Take 5 mL by mouth three (3) times daily as needed for Cough. Max Daily Amount: 15 mL. Indications: COLD SYMPTOMS, Cough  
  
 levalbuterol 1.25 mg/3 mL Nebu Commonly known as:  XOPENEX  
USE ONE VIAL IN NEBULIZER THREE TIMES DAILY AS NEEDED  
  
 losartan 25 mg tablet Commonly known as:  COZAAR Take 1 Tab by mouth daily. For blood pressure Nebulizer & Compressor machine Machine and supplies for as needed nebulizer treatments. Dx: J44.9  
  
 predniSONE 20 mg tablet Commonly known as:  Justyna Ayala Take 3 pills for 3 days, 2 pills for 3 days, 1 pill for 3 days. 1/2 pill for 4 days SITagliptin-metFORMIN  mg per tablet Commonly known as:  Karuna Donis Take 1 Tab by mouth two (2) times daily (with meals). For diabetes  
  
 umeclidinium 62.5 mcg/actuation inhaler Commonly known as:  INCRUSE ELLIPTA Take 1 Puff by inhalation daily. Indications: PREVENTION OF BRONCHOSPASMS WITH EMPHYSEMA Prescriptions Printed Refills  
 guaiFENesin-codeine (ROBITUSSIN AC) 100-10 mg/5 mL solution 0 Sig: Take 5 mL by mouth three (3) times daily as needed for Cough. Max Daily Amount: 15 mL. Indications: COLD SYMPTOMS, Cough Class: Print Route: Oral  
  
Prescriptions Sent to Pharmacy Refills  
 doxycycline (ADOXA) 100 mg tablet 0 Sig: Take 1 Tab by mouth two (2) times a day for 10 days. Class: Normal  
 Pharmacy: [address] [place] [address] #: [phone] Route: Oral  
  
Follow-up Instructions Return in about 3 weeks (around 11/22/2017) for COPD. To-Do List   
 11/01/2017 Imaging:  CT CHEST W WO CONT Referral Information Referral ID Referred By Referred To  
  
 [id number] [facility] Not Available Visits Status Start Date End Date 1 New Request 11/1/17 11/1/18 If your referral has a status of pending review or denied, additional information will be sent to support the outcome of this decision. Introducing [facility] & HEALTH SERVICES! Dear Daniel Sotomayor: Thank you for requesting a [facility] account. Our records indicate that you already have an active [facility] account. You can access your account anytime at [email or web address] Did you know that you can access your hospital and ER discharge instructions at any time in [facility]? You can also review all of your test results from your hospital stay or ER visit. Additional Information If you have questions, please visit the Frequently Asked Questions section of the [employer] website at [email or web address] Remember, [employer] is NOT to be used for urgent needs. For medical emergencies, dial 911. Now available from your iPhone and Android! Please provide this summary of care documentation to your next provider. Your primary care clinician is listed as Amilcar Alvarez. If you have any questions after today's visit, please call [phone].

## 2017-11-01 NOTE — PROGRESS NOTES
HISTORY OF PRESENT ILLNESS  Fiorella Mota is a 62 y.o. female. Blood pressure 130/80, pulse 85, temperature 97.8 °F (36.6 °C), temperature source Oral, resp. rate 16, height 5' 6\" (1.676 m), weight 239 lb 6.4 oz (108.6 kg), SpO2 95 %. Body mass index is 38.64 kg/(m^2). Chief Complaint   Patient presents with    Follow-up     copd    Cold Symptoms     sore throat x 2 days , sneezing         HPI  Poonam MILES Malick 62 y.o. female  presents to the office today for follow up on cough and cold symptoms. Hypertension: Bp at office today 130/80 by manual arm cuff recheck. Pt continues with Losartan 25mg daily. Bp control stable, continue current regimen. Cough: Pt states that she is feeling better from previous visit, but still has a productive cough with yellow or white sputum production. Pt states that she took Incruse on 10/28/17 and woke up the next day with thrush in her mouth so she did not take it on 10/30/17, pt then took the Incruse on 10/31/17 and woke up today with thrush. Pt is followed by Dr. Lisa Carranza (Pulmonology) and saw him on 10/16/17 after she got out of the hospital. Pt states that she has been smoking 1-2 cigarettes a day. Pt had a previous CT in 2016 that showed no pulmonary blistering prior, not groundglass areas of right upper lobe infiltrate may be infectious or inflammatory, enlarged upper mediastinal paratracheal adenopathy which may be reactive. Advised pt that I want to get a chest CT to rule out anything abnormal. Also advised pt to try the Incruse again and I will put her on Doxycycline 100mg for her throat and Robitussin AC for her cough. Health maintenance: Pt states that she has been getting cramps on her sides. Advised pt that it is likely due to her persistent cough. Current Outpatient Prescriptions   Medication Sig Dispense Refill    doxycycline (ADOXA) 100 mg tablet Take 1 Tab by mouth two (2) times a day for 10 days.  20 Tab 0    guaiFENesin-codeine (ROBITUSSIN AC) 100-10 mg/5 mL solution Take 5 mL by mouth three (3) times daily as needed for Cough. Max Daily Amount: 15 mL. Indications: COLD SYMPTOMS, Cough 120 mL 0    umeclidinium (INCRUSE ELLIPTA) 62.5 mcg/actuation inhaler Take 1 Puff by inhalation daily. Indications: PREVENTION OF BRONCHOSPASMS WITH EMPHYSEMA 1 Inhaler 5    SITagliptin-metFORMIN (JANUMET)  mg per tablet Take 1 Tab by mouth two (2) times daily (with meals). For diabetes 60 Tab 5    fluticasone-vilanterol (BREO ELLIPTA) 200-25 mcg/dose inhaler Take 1 Puff by inhalation daily. 1 Inhaler 5    losartan (COZAAR) 25 mg tablet Take 1 Tab by mouth daily. For blood pressure 90 Tab 1    levalbuterol (XOPENEX) 1.25 mg/3 mL nebu USE ONE VIAL IN NEBULIZER THREE TIMES DAILY AS NEEDED 75 Nebule 5    albuterol (PROAIR HFA) 90 mcg/actuation inhaler Take 1 Puff by inhalation every four (4) hours as needed for Wheezing. 1 Inhaler 5    furosemide (LASIX) 20 mg tablet Take 1 Tab by mouth daily. (Patient taking differently: Take 20 mg by mouth as needed.) 30 Tab 5    Nebulizer & Compressor machine Machine and supplies for as needed nebulizer treatments. Dx: J44.9 1 Each 0    predniSONE (DELTASONE) 20 mg tablet Take 3 pills for 3 days, 2 pills for 3 days, 1 pill for 3 days. 1/2 pill for 4 days 21 Tab 0     Allergies   Allergen Reactions    Pcn [Penicillins] Anaphylaxis    Clindamycin Itching     SWELLING & ITCHING IN VAGINAL AREA. Past Medical History:   Diagnosis Date    Anxiety     Arthralgia     Asthma     Asthma     Chronic obstructive pulmonary disease ([facility]     PFT 8/2015    Crack cocaine use     quit; used for 8 years    Diabetes ([facility]     Hemorrhoid     Psychiatric disorder     ANXIETY.     Sleep apnea, obstructive 2016    Tobacco use      Past Surgical History:   Procedure Laterality Date    ENDOSCOPY, COLON, DIAGNOSTIC  2008    F/U 5 years Dr. Roderick Kline    HX CHOLECYSTECTOMY      HX GI      COLONOSCOPY X 1    HX HEENT WISDOM TEETH EXTRACTION. Family History   Problem Relation Age of Onset    Cancer Mother      colon    Diabetes Father     Hypertension Father      Social History   Substance Use Topics    Smoking status: Current Some Day Smoker     Packs/day: 0.25     Years: 22.00     Types: Cigarettes    Smokeless tobacco: Never Used      Comment: smokes about 5 cigarettes/day    Alcohol use No        Review of Systems   Constitutional: Negative. Negative for malaise/fatigue. Eyes: Negative for blurred vision. Respiratory: Positive for cough and sputum production (yellow or white). Negative for shortness of breath. Cardiovascular: Negative for chest pain and leg swelling. Musculoskeletal: Negative. Neurological: Negative. Negative for dizziness and headaches. All other systems reviewed and are negative. Physical Exam   Constitutional: She is oriented to person, place, and time. She appears well-developed and well-nourished. No distress. HENT:   Head: Normocephalic and atraumatic. Posterior tonsillar erythema    Neck: Carotid bruit is not present. Cardiovascular: Normal rate, regular rhythm, normal heart sounds and intact distal pulses. Exam reveals no gallop and no friction rub. No murmur heard. Pulmonary/Chest: Effort normal and breath sounds normal. No respiratory distress. She has no wheezes. She has no rales. Musculoskeletal: She exhibits no edema. Neurological: She is alert and oriented to person, place, and time. Skin: She is not diaphoretic. Psychiatric: She has a normal mood and affect. Her behavior is normal. Judgment and thought content normal.   Nursing note and vitals reviewed. ASSESSMENT and PLAN  Diagnoses and all orders for this visit:    1.  Chronic obstructive pulmonary disease, unspecified COPD type ([facility]  -     CT CHEST W WO CONT; Future  - Advised pt that I want to get a chest CT to rule out anything abnormal. Also advised pt to try the Incruse again and I will put her on Doxycycline 100mg for her throat and Robitussin AC for her cough. 2. Ground glass opacity present on imaging of lung  -     CT CHEST W WO CONT; Future  - See above    3. Adenopathy  -     CT CHEST W WO CONT; Future  - See above    4. Cough  -     guaiFENesin-codeine (ROBITUSSIN AC) 100-10 mg/5 mL solution; Take 5 mL by mouth three (3) times daily as needed for Cough. Max Daily Amount: 15 mL. Indications: COLD SYMPTOMS, Cough  - Advised pt to try the Incruse again and I will put her on Doxycycline 100mg for her throat and Robitussin AC for her cough. 5. Upper respiratory tract infection, unspecified type  -     doxycycline (ADOXA) 100 mg tablet; Take 1 Tab by mouth two (2) times a day for 10 days.  - See above. 6. Obesity, Class II, BMI 35-39.9        - I have reviewed/discussed the above normal BMI with the patient. I have recommended the following interventions: dietary management education, guidance, and counseling, encourage exercise and monitor weight . 7. Tobacco use disorder        - The patient was counseled on the dangers of tobacco use, and was advised to quit. Reviewed strategies to maximize success, including removing cigarettes and smoking materials from environment. Follow-up Disposition:  Return in about 3 weeks (around 11/22/2017) for COPD. Medication risks/benefits/costs/interactions/alternatives discussed with patient. Advised patient to call back or return to office if symptoms worsen/change/persist.  If patient cannot reach us or should anything more severe/urgent arise he/she should proceed directly to the nearest emergency department. Discussed expected course/resolution/complications of diagnosis in detail with patient. Patient given a written after visit summary which includes her diagnoses, current medications and vitals. Patient expressed understanding with the diagnosis and plan.   Written by susu Mcrae, as dictated by Alden Frye M.D.  I have reviewed and agree with the above note and have made corrections where appropriate, Dr. Pauline Kawasaki, MD

## 2017-11-09 ENCOUNTER — TELEPHONE (OUTPATIENT)
Dept: FAMILY MEDICINE CLINIC | Age: 57
End: 2017-11-09

## 2017-11-09 NOTE — TELEPHONE ENCOUNTER
Patient is calling, she is requesting a call back on whether or not the office has received her disability paperwork from her insurance company.      Best call back # for patient: [phone]

## 2017-11-09 NOTE — TELEPHONE ENCOUNTER
Called patient and informed her that her disability paperwork has been received and is on Dr. Ben Cotton desmarguerite. She states that is all she needed to know, she just wanted to be sure that we had received it.

## 2017-11-14 ENCOUNTER — TELEPHONE (OUTPATIENT)
Dept: FAMILY MEDICINE CLINIC | Age: 57
End: 2017-11-14

## 2017-11-14 DIAGNOSIS — R59.0 MEDIASTINAL ADENOPATHY: Primary | ICD-10-CM

## 2017-11-14 DIAGNOSIS — R91.8 GROUND GLASS OPACITY PRESENT ON IMAGING OF LUNG: ICD-10-CM

## 2017-11-14 NOTE — TELEPHONE ENCOUNTER
[phone] spoke to patient advised that we are aware the CT was denied by her insurance I advised her that I send message to Dr Temi Sherman and see what the next testing we should do I did advised Ann Lagunas won't be back till Monday and per patient she has appointment 11/22/2017 with Dr Temi Sherman and they can discuss then what's the next step

## 2017-11-14 NOTE — TELEPHONE ENCOUNTER
Carlos García @ [facility]   -   [phone]        [facility] has denied her Ct Scan because they are stating other things can be done-

## 2017-11-14 NOTE — TELEPHONE ENCOUNTER
[phone] spoke to Juana Giraldo advised her that Dr Balta Abreu order another CT for patient with and without contrast and see if insurance will pay for it.     Per Juana Giraldo will call patient to set up appointment and she will try to get it authorized will call me back if she has problems

## 2017-11-14 NOTE — TELEPHONE ENCOUNTER
----- Message from Pari Gonsales sent at 11/14/2017 10:30 AM EST -----  Regarding: [place]/telephone  Pt stated her insurance denied the CT scan of the chest that the doctor ordered. Pts number is [phone].

## 2017-11-14 NOTE — TELEPHONE ENCOUNTER
[phone] spoke to Jacqueline Hashimoto was denied per Lary Kendrick per [facility] we can do testing for PFT, Echocardiogram   Per Lary Kendrick she called patient and left message that her CT was denied I advised her will send message to Dr Gus Kincaid see what he wants to do next

## 2017-11-22 ENCOUNTER — OFFICE VISIT (OUTPATIENT)
Dept: FAMILY MEDICINE CLINIC | Age: 57
End: 2017-11-22

## 2017-11-22 VITALS
TEMPERATURE: 97.6 F | HEIGHT: 66 IN | WEIGHT: 242.8 LBS | RESPIRATION RATE: 18 BRPM | HEART RATE: 88 BPM | SYSTOLIC BLOOD PRESSURE: 138 MMHG | DIASTOLIC BLOOD PRESSURE: 78 MMHG | BODY MASS INDEX: 39.02 KG/M2 | OXYGEN SATURATION: 97 %

## 2017-11-22 DIAGNOSIS — J44.9 OBSTRUCTIVE CHRONIC BRONCHITIS WITHOUT EXACERBATION (HCC): Primary | ICD-10-CM

## 2017-11-22 DIAGNOSIS — E66.9 OBESITY, CLASS II, BMI 35-39.9: ICD-10-CM

## 2017-11-22 DIAGNOSIS — F17.200 TOBACCO USE DISORDER: ICD-10-CM

## 2017-11-22 DIAGNOSIS — G47.33 OSA (OBSTRUCTIVE SLEEP APNEA): ICD-10-CM

## 2017-11-22 PROBLEM — J43.9 PULMONARY EMPHYSEMA (HCC): Status: ACTIVE | Noted: 2017-11-22

## 2017-11-22 PROBLEM — J43.9 PULMONARY EMPHYSEMA (HCC): Status: RESOLVED | Noted: 2017-11-22 | Resolved: 2017-11-22

## 2017-11-22 NOTE — PATIENT INSTRUCTIONS
Chronic Obstructive Pulmonary Disease (COPD): Care Instructions  Your Care Instructions    Chronic obstructive pulmonary disease (COPD) is a general term for a group of lung diseases, including emphysema and chronic bronchitis. People with COPD have decreased airflow in and out of the lungs, which makes it hard to breathe. The airways also can get clogged with thick mucus. Cigarette smoking is a major cause of COPD. Although there is no cure for COPD, you can slow its progress. Following your treatment plan and taking care of yourself can help you feel better and live longer. Follow-up care is a key part of your treatment and safety. Be sure to make and go to all appointments, and call your doctor if you are having problems. It's also a good idea to know your test results and keep a list of the medicines you take. How can you care for yourself at home? ?Staying healthy  ? · Do not smoke. This is the most important step you can take to prevent more damage to your lungs. If you need help quitting, talk to your doctor about stop-smoking programs and medicines. These can increase your chances of quitting for good. ? · Avoid colds and flu. Get a pneumococcal vaccine shot. If you have had one before, ask your doctor whether you need a second dose. Get the flu vaccine every fall. If you must be around people with colds or the flu, wash your hands often. ? · Avoid secondhand smoke, air pollution, and high altitudes. Also avoid cold, dry air and hot, humid air. Stay at home with your windows closed when air pollution is bad. ?Medicines and oxygen therapy  ? · Take your medicines exactly as prescribed. Call your doctor if you think you are having a problem with your medicine. ? · You may be taking medicines such as:  ¨ Bronchodilators. These help open your airways and make breathing easier. Bronchodilators are either short-acting (work for 6 to 9 hours) or long-acting (work for 24 hours).  You inhale most bronchodilators, so they start to act quickly. Always carry your quick-relief inhaler with you in case you need it while you are away from home. ¨ Corticosteroids (prednisone, budesonide). These reduce airway inflammation. They come in pill or inhaled form. You must take these medicines every day for them to work well. ? · A spacer may help you get more inhaled medicine to your lungs. Ask your doctor or pharmacist if a spacer is right for you. If it is, ask how to use it properly. ? · Do not take any vitamins, over-the-counter medicine, or herbal products without talking to your doctor first.   ? · If your doctor prescribed antibiotics, take them as directed. Do not stop taking them just because you feel better. You need to take the full course of antibiotics. ? · Oxygen therapy boosts the amount of oxygen in your blood and helps you breathe easier. Use the flow rate your doctor has recommended, and do not change it without talking to your doctor first.   Activity  ? · Get regular exercise. Walking is an easy way to get exercise. Start out slowly, and walk a little more each day. ? · Pay attention to your breathing. You are exercising too hard if you cannot talk while you are exercising. ? · Take short rest breaks when doing household chores and other activities. ? · Learn breathing methods-such as breathing through pursed lips-to help you become less short of breath. ? · If your doctor has not set you up with a pulmonary rehabilitation program, talk to him or her about whether rehab is right for you. Rehab includes exercise programs, education about your disease and how to manage it, help with diet and other changes, and emotional support. Diet  ? · Eat regular, healthy meals. Use bronchodilators about 1 hour before you eat to make it easier to eat. Eat several small meals instead of three large ones. Drink beverages at the end of the meal. Avoid foods that are hard to chew.    ? · Eat foods that contain protein so that you do not lose muscle mass. ? · Talk with your doctor if you gain too much weight or if you lose weight without trying. ?Mental health  ? · Talk to your family, friends, or a therapist about your feelings. It is normal to feel frightened, angry, hopeless, helpless, and even guilty. Talking openly about bad feelings can help you cope. If these feelings last, talk to your doctor. When should you call for help? Call 911 anytime you think you may need emergency care. For example, call if:  ? · You have severe trouble breathing. ?Call your doctor now or seek immediate medical care if:  ? · You have new or worse trouble breathing. ? · You cough up blood. ? · You have a fever. ? Watch closely for changes in your health, and be sure to contact your doctor if:  ? · You cough more deeply or more often, especially if you notice more mucus or a change in the color of your mucus. ? · You have new or worse swelling in your legs or belly. ? · You are not getting better as expected. Where can you learn more? Go to http://jennie-ross.info/. Willie Lugo in the search box to learn more about \"Chronic Obstructive Pulmonary Disease (COPD): Care Instructions. \"  Current as of: May 12, 2017  Content Version: 11.4  © 8082-6028 [facility]. Care instructions adapted under license by [employer] (which disclaims liability or warranty for this information). If you have questions about a medical condition or this instruction, always ask your healthcare professional. [address] any warranty or liability for your use of this information.

## 2017-11-22 NOTE — PROGRESS NOTES
Chief Complaint   Patient presents with    COPD     2 weeks f/u, ct chest denied-waitung on peer to peer review with MD    Other     Fill out disablity paperwork        Reviewed Record in preparation for visit and have obtained necessary documentation. Identified pt with two pt identifiers (Name @ )    Health Maintenance Due   Topic    EYE EXAM RETINAL OR DILATED Q1          1. Have you been to the ER, urgent care clinic since your last visit? Hospitalized since your last visit? No    2. Have you seen or consulted any other health care providers outside of the [address] [place] [address] since your last visit? Include any pap smears or colon screening.  No

## 2017-11-22 NOTE — MR AVS SNAPSHOT
Visit Information Date & Time Provider Department Dept. Phone Encounter #  
 11/22/2017  3:45 PM Jonas Campbell  [address] [phone] [id number] Follow-up Instructions Return in about 3 weeks (around 12/13/2017) for copd. Your Appointments 1/8/2018 10:00 AM  
ROUTINE CARE with Jonas Campbell MD  
[facility]) Appt Note: 3 months follow up visit DM  
 [address] [address]  
[phone]  
  
   
 [address] [address] Upcoming Health Maintenance Date Due  
 EYE EXAM RETINAL OR DILATED Q1 7/21/2015 HEMOGLOBIN A1C Q6M 3/21/2018 MICROALBUMIN Q1 4/12/2018 LIPID PANEL Q1 4/12/2018 BREAST CANCER SCRN MAMMOGRAM 5/8/2018 FOOT EXAM Q1 10/2/2018 COLONOSCOPY 1/10/2019 PAP AKA CERVICAL CYTOLOGY 4/20/2020 DTaP/Tdap/Td series (2 - Td) 12/21/2020 Allergies as of 11/22/2017  Review Complete On: 11/22/2017 By: Jonas Campbell MD  
  
 Severity Noted Reaction Type Reactions Pcn [Penicillins] High 07/28/2010    Anaphylaxis Clindamycin  07/28/2010    Itching SWELLING & ITCHING IN VAGINAL AREA. Current Immunizations  Reviewed on 10/2/2017 Name Date Influenza Vaccine (Quad) PF 10/2/2017, 10/2/2015 Influenza Vaccine Split 12/21/2010 Pneumococcal Conjugate (PCV-13) 10/2/2017 Pneumococcal Vaccine (Unspecified Type) 1/1/2009 TDAP Vaccine 12/21/2010 Not reviewed this visit You Were Diagnosed With   
  
 Codes Comments Obstructive chronic bronchitis without exacerbation ([facility]    -  Primary ICD-10-CM: J44.9 ICD-9-CM: 491.20 Tobacco use disorder     ICD-10-CM: F17.200 ICD-9-CM: 305.1 Obesity, Class II, BMI 35-39.9     ICD-10-CM: E66.9 ICD-9-CM: 278.00   
 GLENNA (obstructive sleep apnea)     ICD-10-CM: G47.33 
ICD-9-CM: 327.23 Vitals BP Pulse Temp Resp Height(growth percentile) Weight(growth percentile) 138/78 88 97.6 °F (36.4 °C) (Oral) 18 5' 6\" (1.676 m) 242 lb 12.8 oz (110.1 kg) SpO2 BMI OB Status Smoking Status 97% 39.19 kg/m2 Postmenopausal Current Some Day Smoker Vitals History BMI and BSA Data Body Mass Index Body Surface Area  
 39.19 kg/m 2 2.26 m 2 Preferred Pharmacy Pharmacy Name Phone [phone] [phone] [facility] [phone] Your Updated Medication List  
  
   
This list is accurate as of: 11/22/17  4:56 PM.  Always use your most recent med list.  
  
  
  
  
 albuterol 90 mcg/actuation inhaler Commonly known as:  PROAIR HFA Take 1 Puff by inhalation every four (4) hours as needed for Wheezing. fluticasone-vilanterol 200-25 mcg/dose inhaler Commonly known as:  BREO ELLIPTA Take 1 Puff by inhalation daily. furosemide 20 mg tablet Commonly known as:  LASIX Take 1 Tab by mouth daily. guaiFENesin-codeine 100-10 mg/5 mL solution Commonly known as:  ROBITUSSIN AC Take 5 mL by mouth three (3) times daily as needed for Cough. Max Daily Amount: 15 mL. Indications: COLD SYMPTOMS, Cough  
  
 levalbuterol 1.25 mg/3 mL Nebu Commonly known as:  XOPENEX  
USE ONE VIAL IN NEBULIZER THREE TIMES DAILY AS NEEDED  
  
 losartan 25 mg tablet Commonly known as:  COZAAR Take 1 Tab by mouth daily. For blood pressure Nebulizer & Compressor machine Machine and supplies for as needed nebulizer treatments. Dx: B65.8 SITagliptin-metFORMIN  mg per tablet Commonly known as:  Abernathy Conradi Take 1 Tab by mouth two (2) times daily (with meals). For diabetes  
  
 umeclidinium 62.5 mcg/actuation inhaler Commonly known as:  INCRUSE ELLIPTA Take 1 Puff by inhalation daily. Indications: PREVENTION OF BRONCHOSPASMS WITH EMPHYSEMA We Performed the Following REFERRAL TO PULMONARY DISEASE [WBO24 Custom] Follow-up Instructions Return in about 3 weeks (around 12/13/2017) for copd. Referral Information Referral ID Referred By Referred To  
  
 [id number] [facility] [address] [place] [address] Visits Status Start Date End Date 1 New Request 11/22/17 11/22/18 If your referral has a status of pending review or denied, additional information will be sent to support the outcome of this decision. Patient Instructions Chronic Obstructive Pulmonary Disease (COPD): Care Instructions Your Care Instructions Chronic obstructive pulmonary disease (COPD) is a general term for a group of lung diseases, including emphysema and chronic bronchitis. People with COPD have decreased airflow in and out of the lungs, which makes it hard to breathe. The airways also can get clogged with thick mucus. Cigarette smoking is a major cause of COPD. Although there is no cure for COPD, you can slow its progress. Following your treatment plan and taking care of yourself can help you feel better and live longer. Follow-up care is a key part of your treatment and safety. Be sure to make and go to all appointments, and call your doctor if you are having problems. It's also a good idea to know your test results and keep a list of the medicines you take. How can you care for yourself at home? ?Staying healthy ? · Do not smoke. This is the most important step you can take to prevent more damage to your lungs. If you need help quitting, talk to your doctor about stop-smoking programs and medicines. These can increase your chances of quitting for good. ? · Avoid colds and flu. Get a pneumococcal vaccine shot. If you have had one before, ask your doctor whether you need a second dose. Get the flu vaccine every fall.  If you must be around people with colds or the flu, wash your hands often. ? · Avoid secondhand smoke, air pollution, and high altitudes. Also avoid cold, dry air and hot, humid air. Stay at home with your windows closed when air pollution is bad. ?Medicines and oxygen therapy ? · Take your medicines exactly as prescribed. Call your doctor if you think you are having a problem with your medicine. ? · You may be taking medicines such as: ¨ Bronchodilators. These help open your airways and make breathing easier. Bronchodilators are either short-acting (work for 6 to 9 hours) or long-acting (work for 24 hours). You inhale most bronchodilators, so they start to act quickly. Always carry your quick-relief inhaler with you in case you need it while you are away from home. ¨ Corticosteroids (prednisone, budesonide). These reduce airway inflammation. They come in pill or inhaled form. You must take these medicines every day for them to work well. ? · A spacer may help you get more inhaled medicine to your lungs. Ask your doctor or pharmacist if a spacer is right for you. If it is, ask how to use it properly. ? · Do not take any vitamins, over-the-counter medicine, or herbal products without talking to your doctor first.  
? · If your doctor prescribed antibiotics, take them as directed. Do not stop taking them just because you feel better. You need to take the full course of antibiotics. ? · Oxygen therapy boosts the amount of oxygen in your blood and helps you breathe easier. Use the flow rate your doctor has recommended, and do not change it without talking to your doctor first.  
Activity ? · Get regular exercise. Walking is an easy way to get exercise. Start out slowly, and walk a little more each day. ? · Pay attention to your breathing. You are exercising too hard if you cannot talk while you are exercising. ? · Take short rest breaks when doing household chores and other activities. ? · Learn breathing methods-such as breathing through pursed lips-to help you become less short of breath. ? · If your doctor has not set you up with a pulmonary rehabilitation program, talk to him or her about whether rehab is right for you. Rehab includes exercise programs, education about your disease and how to manage it, help with diet and other changes, and emotional support. Diet ? · Eat regular, healthy meals. Use bronchodilators about 1 hour before you eat to make it easier to eat. Eat several small meals instead of three large ones. Drink beverages at the end of the meal. Avoid foods that are hard to chew. ? · Eat foods that contain protein so that you do not lose muscle mass. ? · Talk with your doctor if you gain too much weight or if you lose weight without trying. ?Mental health ? · Talk to your family, friends, or a therapist about your feelings. It is normal to feel frightened, angry, hopeless, helpless, and even guilty. Talking openly about bad feelings can help you cope. If these feelings last, talk to your doctor. When should you call for help? Call 911 anytime you think you may need emergency care. For example, call if: 
? · You have severe trouble breathing. ?Call your doctor now or seek immediate medical care if: 
? · You have new or worse trouble breathing. ? · You cough up blood. ? · You have a fever. ? Watch closely for changes in your health, and be sure to contact your doctor if: 
? · You cough more deeply or more often, especially if you notice more mucus or a change in the color of your mucus. ? · You have new or worse swelling in your legs or belly. ? · You are not getting better as expected. Where can you learn more? Go to http://jennie-ross.info/. Levell Nabila in the search box to learn more about \"Chronic Obstructive Pulmonary Disease (COPD): Care Instructions. \" Current as of: May 12, 2017 Content Version: 11.4 © 5872-3932 [employer]. Care instructions adapted under license by [employer] (which disclaims liability or warranty for this information). If you have questions about a medical condition or this instruction, always ask your healthcare professional. [address] any warranty or liability for your use of this information. Introducing [facility]! Dear Clayton Newton: Thank you for requesting a [employer] account. Our records indicate that you already have an active Deligic account. You can access your account anytime at [email or web address] Did you know that you can access your hospital and ER discharge instructions at any time in Deligic? You can also review all of your test results from your hospital stay or ER visit. Additional Information If you have questions, please visit the Frequently Asked Questions section of the Deligic website at [email or web address] Remember, Deligic is NOT to be used for urgent needs. For medical emergencies, dial 911. Now available from your iPhone and Android! Please provide this summary of care documentation to your next provider. Your primary care clinician is listed as Lore Mckeon. If you have any questions after today's visit, please call [phone].

## 2017-11-22 NOTE — LETTER
NOTIFICATION RETURN TO WORK / SCHOOL 
 
11/22/2017 4:55 PM 
 
Ms. Zahraa Sheehan [address] [address] To Whom It May Concern: 
 
Zahraa Sheehan is currently under the care of CHARLIE Blanton. She will return to work/school on: 12/11/17 If there are questions or concerns please have the patient contact our office. Sincerely, Andrey Nicholas MD

## 2017-11-22 NOTE — PROGRESS NOTES
HISTORY OF PRESENT ILLNESS  Edd Bacon is a 62 y.o. female. Blood pressure 138/78, pulse 88, temperature 97.6 °F (36.4 °C), temperature source Oral, resp. rate 18, height 5' 6\" (1.676 m), weight 242 lb 12.8 oz (110.1 kg), SpO2 97 %. Body mass index is 39.19 kg/(m^2). Chief Complaint   Patient presents with    COPD     2 weeks f/u, ct chest denied-waitung on peer to peer review with MD    Other     Fill out disablity paperwork         HPI  Poonam Teresa 62 y.o. female  presents to the office today for COPD follow up. COPD: Pt reports that every time she uses the Incruse she gets thrush in her mouth and has stopped using the Incruse. Discussed with pt trying to gargle warm water. Pt notes that she still has exhaustion with exertion. Pt states that she is not scheduled to see Dr. Rodo Hernandez (Pulmonology) until the new year. Advised pt that she needs to get in to see her pulmonologist sooner. Pt reports that her work has told her she can take off until after her break is over in December. Pt states that she probably needs to see a new pulmonologist. Pt notes that when she was in the hospital she was seen by Dr. Farhan Jarvis (Pulmonology). Pt states that she is still smoking 2-3 cigarettes a day. Advised pt to quit smoking. I will refer pt to Dr. Gris Jacob (Pulmonology) for further evaluation. Pt has asked if she can return to work on 12/11/17 and I will follow up with her on 12/13/17. Health maintenance: Pt states that she has been having congestion and was given Mucinex. Advised pt that she needs to make sure that she is having plenty of water with the Mucinex. Advised pt that I need to do a prior authorization for pt's chest XR. Current Outpatient Prescriptions   Medication Sig Dispense Refill    umeclidinium (INCRUSE ELLIPTA) 62.5 mcg/actuation inhaler Take 1 Puff by inhalation daily.  Indications: PREVENTION OF BRONCHOSPASMS WITH EMPHYSEMA 1 Inhaler 5    SITagliptin-metFORMIN (JANUMET)  mg per tablet Take 1 Tab by mouth two (2) times daily (with meals). For diabetes 60 Tab 5    fluticasone-vilanterol (BREO ELLIPTA) 200-25 mcg/dose inhaler Take 1 Puff by inhalation daily. 1 Inhaler 5    losartan (COZAAR) 25 mg tablet Take 1 Tab by mouth daily. For blood pressure 90 Tab 1    levalbuterol (XOPENEX) 1.25 mg/3 mL nebu USE ONE VIAL IN NEBULIZER THREE TIMES DAILY AS NEEDED 75 Nebule 5    albuterol (PROAIR HFA) 90 mcg/actuation inhaler Take 1 Puff by inhalation every four (4) hours as needed for Wheezing. 1 Inhaler 5    furosemide (LASIX) 20 mg tablet Take 1 Tab by mouth daily. (Patient taking differently: Take 20 mg by mouth as needed.) 30 Tab 5    Nebulizer & Compressor machine Machine and supplies for as needed nebulizer treatments. Dx: J44.9 1 Each 0    guaiFENesin-codeine (ROBITUSSIN AC) 100-10 mg/5 mL solution Take 5 mL by mouth three (3) times daily as needed for Cough. Max Daily Amount: 15 mL. Indications: COLD SYMPTOMS, Cough 120 mL 0     Allergies   Allergen Reactions    Pcn [Penicillins] Anaphylaxis    Clindamycin Itching     SWELLING & ITCHING IN VAGINAL AREA. Past Medical History:   Diagnosis Date    Anxiety     Arthralgia     Asthma     Asthma     Chronic obstructive pulmonary disease ([facility]     PFT 8/2015    Crack cocaine use     quit; used for 8 years    Diabetes ([facility]     Hemorrhoid     Psychiatric disorder     ANXIETY.  Sleep apnea, obstructive 2016    Tobacco use      Past Surgical History:   Procedure Laterality Date    ENDOSCOPY, COLON, DIAGNOSTIC  2008    F/U 5 years Dr. Анна Escudero HX GI      COLONOSCOPY X 1    HX HEENT      WISDOM TEETH EXTRACTION.      Family History   Problem Relation Age of Onset    Cancer Mother      colon    Diabetes Father     Hypertension Father      Social History   Substance Use Topics    Smoking status: Current Some Day Smoker     Packs/day: 0.25     Years: 22.00     Types: Cigarettes  Smokeless tobacco: Never Used      Comment: smokes about 5 cigarettes/day    Alcohol use No        Review of Systems   Constitutional: Negative. Negative for malaise/fatigue. Eyes: Negative for blurred vision. Respiratory: Negative for shortness of breath. Cardiovascular: Negative for chest pain and leg swelling. Musculoskeletal: Negative. Neurological: Negative. Negative for dizziness and headaches. All other systems reviewed and are negative. Physical Exam   Constitutional: She is oriented to person, place, and time. She appears well-developed and well-nourished. No distress. HENT:   Head: Normocephalic and atraumatic. Neck: Carotid bruit is not present. Cardiovascular: Normal rate, regular rhythm, normal heart sounds and intact distal pulses. Exam reveals no gallop and no friction rub. No murmur heard. Pulmonary/Chest: Effort normal. No respiratory distress. She has wheezes (expiratory). She has no rales. Musculoskeletal: She exhibits no edema. Neurological: She is alert and oriented to person, place, and time. Skin: She is not diaphoretic. Psychiatric: She has a normal mood and affect. Her behavior is normal. Judgment and thought content normal.   Nursing note and vitals reviewed. ASSESSMENT and PLAN  Diagnoses and all orders for this visit:    1. Asthma COPD overlap syndrome(HCC)  -     [facility] (Dr. Kalyn Blanco)  - Advised pt to quit smoking.   - I will refer pt to Dr. Kalyn Blanco (Pulmonology) for further evaluation. 2. Tobacco use disorder        - The patient was counseled on the dangers of tobacco use, and was advised to quit. Reviewed strategies to maximize success, including removing cigarettes and smoking materials from environment. 3. Obesity, Class II, BMI 35-39.9        - I have reviewed/discussed the above normal BMI with the patient.   I have recommended the following interventions: dietary management education, guidance, and counseling, encourage exercise and monitor weight . 4. GLENNA (obstructive sleep apnea)        - I will refer pt to Dr. Delia Womack (Pulmonology) for further evaluation. Follow-up Disposition:  Return in about 3 weeks (around 12/13/2017) for copd. Medication risks/benefits/costs/interactions/alternatives discussed with patient. Advised patient to call back or return to office if symptoms worsen/change/persist.  If patient cannot reach us or should anything more severe/urgent arise he/she should proceed directly to the nearest emergency department. Discussed expected course/resolution/complications of diagnosis in detail with patient. Patient given a written after visit summary which includes her diagnoses, current medications and vitals. Patient expressed understanding with the diagnosis and plan. Written by susu Drake, as dictated by Milla Jacobs M.D.   I have reviewed and agree with the above note and have made corrections where appropriate, Dr. Patricio Sanz MD

## 2017-12-13 ENCOUNTER — OFFICE VISIT (OUTPATIENT)
Dept: FAMILY MEDICINE CLINIC | Age: 57
End: 2017-12-13

## 2017-12-13 VITALS
HEIGHT: 65 IN | RESPIRATION RATE: 18 BRPM | WEIGHT: 243 LBS | OXYGEN SATURATION: 93 % | TEMPERATURE: 98.8 F | BODY MASS INDEX: 40.48 KG/M2 | SYSTOLIC BLOOD PRESSURE: 131 MMHG | DIASTOLIC BLOOD PRESSURE: 70 MMHG | HEART RATE: 92 BPM

## 2017-12-13 DIAGNOSIS — F17.200 TOBACCO USE DISORDER: ICD-10-CM

## 2017-12-13 DIAGNOSIS — J44.9 OBSTRUCTIVE CHRONIC BRONCHITIS WITHOUT EXACERBATION (HCC): Primary | ICD-10-CM

## 2017-12-13 DIAGNOSIS — E66.01 OBESITY, CLASS III, BMI 40-49.9 (MORBID OBESITY) (HCC): ICD-10-CM

## 2017-12-13 DIAGNOSIS — E11.9 TYPE 2 DIABETES MELLITUS WITHOUT COMPLICATION, WITHOUT LONG-TERM CURRENT USE OF INSULIN (HCC): ICD-10-CM

## 2017-12-13 NOTE — MR AVS SNAPSHOT
Visit Information Date & Time Provider Department Dept. Phone Encounter #  
 12/13/2017  3:45 PM Britt Dudley MD [address] [place] [address] [phone] [id number] Follow-up Instructions Return if symptoms worsen or fail to improve, for COPD. Your Appointments 1/8/2018 10:00 AM  
ROUTINE CARE with Britt Dudley MD  
[facility]) Appt Note: 3 months follow up visit DM  
 [address] [address]  
[phone]  
  
   
 [address] [address] Upcoming Health Maintenance Date Due  
 EYE EXAM RETINAL OR DILATED Q1 7/21/2015 HEMOGLOBIN A1C Q6M 3/21/2018 MICROALBUMIN Q1 4/12/2018 LIPID PANEL Q1 4/12/2018 BREAST CANCER SCRN MAMMOGRAM 5/8/2018 FOOT EXAM Q1 10/2/2018 COLONOSCOPY 1/10/2019 PAP AKA CERVICAL CYTOLOGY 4/20/2020 DTaP/Tdap/Td series (2 - Td) 12/21/2020 Allergies as of 12/13/2017  Review Complete On: 12/13/2017 By: Isamar Blanton Severity Noted Reaction Type Reactions Pcn [Penicillins] High 07/28/2010    Anaphylaxis Clindamycin  07/28/2010    Itching SWELLING & ITCHING IN VAGINAL AREA. Current Immunizations  Reviewed on 10/2/2017 Name Date Influenza Vaccine (Quad) PF 10/2/2017, 10/2/2015 Influenza Vaccine Split 12/21/2010 Pneumococcal Conjugate (PCV-13) 10/2/2017 Pneumococcal Vaccine (Unspecified Type) 1/1/2009 TDAP Vaccine 12/21/2010 Not reviewed this visit You Were Diagnosed With   
  
 Codes Comments Obstructive chronic bronchitis without exacerbation ([facility]    -  Primary ICD-10-CM: J44.9 ICD-9-CM: 491.20 Obesity, Class III, BMI 40-49.9 (morbid obesity) (HCC)     ICD-10-CM: E66.01 
ICD-9-CM: 278.01 Type 2 diabetes mellitus without complication, without long-term current use of insulin (HCC)     ICD-10-CM: E11.9 ICD-9-CM: 250.00 Tobacco use disorder     ICD-10-CM: F17.200 ICD-9-CM: 305.1 Vitals BP Pulse Temp Resp Height(growth percentile) Weight(growth percentile) 131/70 (BP 1 Location: Left arm, BP Patient Position: Sitting) 92 98.8 °F (37.1 °C) (Oral) 18 5' 5\" (1.651 m) 243 lb (110.2 kg) SpO2 BMI OB Status Smoking Status 93% 40.44 kg/m2 Postmenopausal Current Some Day Smoker BMI and BSA Data Body Mass Index Body Surface Area 40.44 kg/m 2 2.25 m 2 Preferred Pharmacy Pharmacy Name Phone [phone] [facility] [phone] Your Updated Medication List  
  
   
This list is accurate as of: 12/13/17  4:53 PM.  Always use your most recent med list.  
  
  
  
  
 albuterol 90 mcg/actuation inhaler Commonly known as:  PROAIR HFA Take 1 Puff by inhalation every four (4) hours as needed for Wheezing. fluticasone-vilanterol 200-25 mcg/dose inhaler Commonly known as:  BREO ELLIPTA Take 1 Puff by inhalation daily. furosemide 20 mg tablet Commonly known as:  LASIX Take 1 Tab by mouth daily. guaiFENesin-codeine 100-10 mg/5 mL solution Commonly known as:  ROBITUSSIN AC Take 5 mL by mouth three (3) times daily as needed for Cough. Max Daily Amount: 15 mL. Indications: COLD SYMPTOMS, Cough  
  
 levalbuterol 1.25 mg/3 mL Nebu Commonly known as:  XOPENEX  
USE ONE VIAL IN NEBULIZER THREE TIMES DAILY AS NEEDED  
  
 losartan 25 mg tablet Commonly known as:  COZAAR Take 1 Tab by mouth daily. For blood pressure Nebulizer & Compressor machine Machine and supplies for as needed nebulizer treatments. Dx: X69.2 SITagliptin-metFORMIN  mg per tablet Commonly known as:  [place] Anthony Take 1 Tab by mouth two (2) times daily (with meals). For diabetes  
  
 umeclidinium 62.5 mcg/actuation inhaler Commonly known as:  INCRUSE ELLIPTA Take 1 Puff by inhalation daily. Indications: PREVENTION OF BRONCHOSPASMS WITH EMPHYSEMA We Performed the Following HEMOGLOBIN A1C WITH EAG [01301 CPT(R)] Follow-up Instructions Return if symptoms worsen or fail to improve, for COPD. Introducing [facility]! Dear Luis Antonio Murillo: Thank you for requesting a [employer] account. Our records indicate that you already have an active [employer] account. You can access your account anytime at [email or web address] Did you know that you can access your hospital and ER discharge instructions at any time in [employer]? You can also review all of your test results from your hospital stay or ER visit. Additional Information If you have questions, please visit the Frequently Asked Questions section of the [employer] website at [email or web address] Remember, [employer] is NOT to be used for urgent needs. For medical emergencies, dial 911. Now available from your iPhone and Android! Please provide this summary of care documentation to your next provider. Your primary care clinician is listed as Danielle Finney. If you have any questions after today's visit, please call [phone].

## 2017-12-13 NOTE — PROGRESS NOTES
Chief Complaint   Patient presents with    Follow-up    COPD     1. Have you been to the ER, urgent care clinic since your last visit? Hospitalized since your last visit? No    2. Have you seen or consulted any other health care providers outside of the [address] [place] [address] since your last visit? Include any pap smears or colon screening.  No

## 2017-12-13 NOTE — LETTER
NOTIFICATION RETURN TO WORK  
 
12/13/2017 4:29 PM 
 
Ms. Mayito Carter [address] [facility] [address] To Whom It May Concern: 
 
Mayito Carter is currently under the care of CHARLIE Blanton. She will return to work on: 1/9/18 If there are questions or concerns please have the patient contact our office. Sincerely, Maura Halsted, MD

## 2017-12-13 NOTE — PROGRESS NOTES
HISTORY OF PRESENT ILLNESS  Chio Renner is a 62 y.o. female. Blood pressure 131/70, pulse 92, temperature 98.8 °F (37.1 °C), temperature source Oral, resp. rate 18, height 5' 5\" (1.651 m), weight 243 lb (110.2 kg), SpO2 93 %. Body mass index is 40.44 kg/(m^2). Chief Complaint   Patient presents with    Follow-up    COPD        HPI  Poonam Renner 62 y.o. female  presents to the office today for COPD follow up. 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 Pt notes that she is down to 2 cigarettes a day and knows that she needs to quit. Pt is currently taking Incruse 1 puff daily, Breo Ellipta 1 puff daily, and Albuterol 1 puff every 4 hours PRN. Discussed with pt that her smoking is not helping her situation and that she may not be able to return to her current line of work. Pt states that she got dizzy and light headed this morning when turning her car on. She has an O2 sats meter at home, but does not check it regularly. Pt has asked if I can get her a note for work to be out until her next visit on 01/08/18. Pt is compliant with treatment, continue current regimen. Had pt walk around the clinic and recorded her O2 sats. During her walk pt's O2 sats dropped to 88% and her lowest HR was 113. Upon resting her O2 sats returned to 93-94%. Discussed with pt that 88% is very low and concerned me. Will follow up with pt on 01/08/18 after she sees her pulmonologist in 2 weeks. Current Outpatient Prescriptions   Medication Sig Dispense Refill    umeclidinium (INCRUSE ELLIPTA) 62.5 mcg/actuation inhaler Take 1 Puff by inhalation daily. Indications: PREVENTION OF BRONCHOSPASMS WITH EMPHYSEMA 1 Inhaler 5    SITagliptin-metFORMIN (JANUMET)  mg per tablet Take 1 Tab by mouth two (2) times daily (with meals).  For diabetes 60 Tab 5    fluticasone-vilanterol (BREO ELLIPTA) 200-25 mcg/dose inhaler Take 1 Puff by inhalation daily. 1 Inhaler 5    losartan (COZAAR) 25 mg tablet Take 1 Tab by mouth daily. For blood pressure 90 Tab 1    levalbuterol (XOPENEX) 1.25 mg/3 mL nebu USE ONE VIAL IN NEBULIZER THREE TIMES DAILY AS NEEDED 75 Nebule 5    albuterol (PROAIR HFA) 90 mcg/actuation inhaler Take 1 Puff by inhalation every four (4) hours as needed for Wheezing. 1 Inhaler 5    furosemide (LASIX) 20 mg tablet Take 1 Tab by mouth daily. (Patient taking differently: Take 20 mg by mouth as needed.) 30 Tab 5    Nebulizer & Compressor machine Machine and supplies for as needed nebulizer treatments. Dx: J44.9 1 Each 0    guaiFENesin-codeine (ROBITUSSIN AC) 100-10 mg/5 mL solution Take 5 mL by mouth three (3) times daily as needed for Cough. Max Daily Amount: 15 mL. Indications: COLD SYMPTOMS, Cough 120 mL 0     Allergies   Allergen Reactions    Pcn [Penicillins] Anaphylaxis    Clindamycin Itching     SWELLING & ITCHING IN VAGINAL AREA. Past Medical History:   Diagnosis Date    Anxiety     Arthralgia     Asthma     Asthma     Chronic obstructive pulmonary disease ([facility]     PFT 8/2015    Crack cocaine use     quit; used for 8 years    Diabetes ([facility]     Hemorrhoid     Psychiatric disorder     ANXIETY.  Sleep apnea, obstructive 2016    Tobacco use      Past Surgical History:   Procedure Laterality Date    ENDOSCOPY, COLON, DIAGNOSTIC  2008    F/U 5 years Dr. Marilyn Hancock HX GI      COLONOSCOPY X 1    HX HEENT      WISDOM TEETH EXTRACTION. Family History   Problem Relation Age of Onset    Cancer Mother      colon    Diabetes Father     Hypertension Father      Social History   Substance Use Topics    Smoking status: Current Some Day Smoker     Packs/day: 0.25     Years: 22.00     Types: Cigarettes    Smokeless tobacco: Never Used      Comment: smokes about 5 cigarettes/day    Alcohol use No        Review of Systems   Constitutional: Negative. Negative for malaise/fatigue.    Eyes: Negative for blurred vision. Respiratory: Positive for cough and shortness of breath. Cardiovascular: Negative for chest pain and leg swelling. Musculoskeletal: Negative. Neurological: Positive for dizziness. Negative for headaches. All other systems reviewed and are negative. Physical Exam   Constitutional: She is oriented to person, place, and time. She appears well-developed and well-nourished. No distress. HENT:   Head: Normocephalic and atraumatic. Neck: Carotid bruit is not present. Cardiovascular: Normal rate, regular rhythm, normal heart sounds and intact distal pulses. Exam reveals no gallop and no friction rub. No murmur heard. Pulmonary/Chest: Effort normal. No respiratory distress. She has decreased breath sounds. She has wheezes (scattered, bilaterally). She has no rales. Musculoskeletal: She exhibits no edema. Neurological: She is alert and oriented to person, place, and time. Skin: She is not diaphoretic. Psychiatric: She has a normal mood and affect. Her behavior is normal. Judgment and thought content normal.   Nursing note and vitals reviewed. ASSESSMENT and PLAN  Diagnoses and all orders for this visit:    1. Asthma COPD overlap syndrome(HCC)        - Discussed with pt that her smoking is not helping her situation and that she may not be able to return to her current line of work. - Pt is compliant with treatment, continue current regimen.         - Claim number [id number], pt will hopefully return to work on 01/09/18.         - Pt is due to see pulmonologist in 2 weeks for her COPD that has not improved. 2. Obesity, Class III, BMI 40-49.9 (morbid obesity) ([facility]        - I have reviewed/discussed the above normal BMI with the patient. I have recommended the following interventions: dietary management education, guidance, and counseling, encourage exercise and monitor weight . 3.  Tobacco use disorder         - The patient was counseled on the dangers of tobacco use, and was advised to quit. Reviewed strategies to maximize success, including removing cigarettes and smoking materials from environment. 4. Type 2 diabetes mellitus without complication, without long-term current use of insulin (HCC)  -     HEMOGLOBIN A1C WITH EAG  - Will check pt's A1c today in lab to assess her diabetes. Follow-up Disposition:  Return if symptoms worsen or fail to improve, for COPD. Medication risks/benefits/costs/interactions/alternatives discussed with patient. Advised patient to call back or return to office if symptoms worsen/change/persist.  If patient cannot reach us or should anything more severe/urgent arise he/she should proceed directly to the nearest emergency department. Discussed expected course/resolution/complications of diagnosis in detail with patient. Patient given a written after visit summary which includes her diagnoses, current medications and vitals. Patient expressed understanding with the diagnosis and plan. Written by Kieran Shone, scribe, as dictated by Josh Timmons M.D.   I have reviewed and agree with the above note and have made corrections where appropriate, Dr. Yessenia Levine MD

## 2017-12-19 ENCOUNTER — TELEPHONE (OUTPATIENT)
Dept: FAMILY MEDICINE CLINIC | Age: 57
End: 2017-12-19

## 2018-01-03 ENCOUNTER — HOSPITAL ENCOUNTER (OUTPATIENT)
Dept: CT IMAGING | Age: 58
Discharge: HOME | End: 2018-01-03
Payer: COMMERCIAL

## 2018-01-03 DIAGNOSIS — J44.9 OBSTRUCTIVE CHRONIC BRONCHITIS WITHOUT EXACERBATION (HCC): ICD-10-CM

## 2018-01-03 PROCEDURE — 71250 CT THORAX DX C-: CPT

## 2018-01-08 ENCOUNTER — OFFICE VISIT (OUTPATIENT)
Dept: FAMILY MEDICINE CLINIC | Age: 58
End: 2018-01-08

## 2018-01-08 VITALS
TEMPERATURE: 97.4 F | DIASTOLIC BLOOD PRESSURE: 62 MMHG | RESPIRATION RATE: 18 BRPM | BODY MASS INDEX: 40.15 KG/M2 | SYSTOLIC BLOOD PRESSURE: 132 MMHG | HEIGHT: 65 IN | HEART RATE: 90 BPM | OXYGEN SATURATION: 96 % | WEIGHT: 241 LBS

## 2018-01-08 DIAGNOSIS — E11.9 TYPE 2 DIABETES MELLITUS WITHOUT COMPLICATION, WITHOUT LONG-TERM CURRENT USE OF INSULIN (HCC): Primary | ICD-10-CM

## 2018-01-08 DIAGNOSIS — F17.200 TOBACCO USE DISORDER: ICD-10-CM

## 2018-01-08 DIAGNOSIS — E66.01 OBESITY, CLASS III, BMI 40-49.9 (MORBID OBESITY) (HCC): ICD-10-CM

## 2018-01-08 DIAGNOSIS — Z12.39 SCREENING FOR MALIGNANT NEOPLASM OF BREAST: ICD-10-CM

## 2018-01-08 DIAGNOSIS — L84 PRE-ULCERATIVE CORN OR CALLOUS: ICD-10-CM

## 2018-01-08 DIAGNOSIS — R68.2 DRY MOUTH: ICD-10-CM

## 2018-01-08 DIAGNOSIS — J44.9 OBSTRUCTIVE CHRONIC BRONCHITIS WITHOUT EXACERBATION (HCC): ICD-10-CM

## 2018-01-08 DIAGNOSIS — E78.6 LOW HDL (UNDER 40): ICD-10-CM

## 2018-01-08 NOTE — PROGRESS NOTES
Chief Complaint   Patient presents with    Diabetes    COPD       Reviewed Record in preparation for visit and have obtained necessary documentation. Identified pt with two pt identifiers (Name @ )    Health Maintenance Due   Topic    EYE EXAM RETINAL OR DILATED Q1     BREAST CANCER SCRN MAMMOGRAM          1. Have you been to the ER, urgent care clinic since your last visit? Hospitalized since your last visit? No    2. Have you seen or consulted any other health care providers outside of the [address] [place] [address] since your last visit? Include any pap smears or colon screening.  No

## 2018-01-08 NOTE — MR AVS SNAPSHOT
Visit Information Date & Time Provider Department Dept. Phone Encounter #  
 1/8/2018 10:00 AM Meagan Watson MD [address] [place] [address] [phone] [id number] Follow-up Instructions Return in about 3 months (around 4/8/2018) for diabetes follow up. Upcoming Health Maintenance Date Due  
 EYE EXAM RETINAL OR DILATED Q1 7/21/2015 BREAST CANCER SCRN MAMMOGRAM 5/8/2018 HEMOGLOBIN A1C Q6M 3/21/2018 MICROALBUMIN Q1 4/12/2018 LIPID PANEL Q1 4/12/2018 FOOT EXAM Q1 10/2/2018 COLONOSCOPY 1/10/2019 PAP AKA CERVICAL CYTOLOGY 4/20/2020 DTaP/Tdap/Td series (2 - Td) 12/21/2020 Allergies as of 1/8/2018  Review Complete On: 1/8/2018 By: Meagan Watson MD  
  
 Severity Noted Reaction Type Reactions Pcn [Penicillins] High 07/28/2010    Anaphylaxis Clindamycin  07/28/2010    Itching SWELLING & ITCHING IN VAGINAL AREA. Current Immunizations  Reviewed on 10/2/2017 Name Date Influenza Vaccine (Quad) PF 10/2/2017, 10/2/2015 Influenza Vaccine Split 12/21/2010 Pneumococcal Conjugate (PCV-13) 10/2/2017 Pneumococcal Vaccine (Unspecified Type) 1/1/2009 TDAP Vaccine 12/21/2010 Not reviewed this visit You Were Diagnosed With   
  
 Codes Comments Type 2 diabetes mellitus without complication, without long-term current use of insulin (HCC)    -  Primary ICD-10-CM: E11.9 ICD-9-CM: 250.00 Low HDL (under 40)     ICD-10-CM: E78.6 ICD-9-CM: 272.5 Obstructive chronic bronchitis without exacerbation ([facility]     ICD-10-CM: J44.9 ICD-9-CM: 491.20 Tobacco use disorder     ICD-10-CM: F17.200 ICD-9-CM: 305.1 Obesity, Class III, BMI 40-49.9 (morbid obesity) (HCC)     ICD-10-CM: E66.01 
ICD-9-CM: 278.01 Screening for malignant neoplasm of breast     ICD-10-CM: Z12.31 
ICD-9-CM: V76.10 Dry mouth     ICD-10-CM: R68.2 ICD-9-CM: 527.7 Pre-ulcerative corn or callous     ICD-10-CM: L84 
ICD-9-CM: 700 Vitals BP Pulse Temp Resp Height(growth percentile) Weight(growth percentile) 132/62 (BP 1 Location: Right arm, BP Patient Position: Sitting) 90 97.4 °F (36.3 °C) (Oral) 18 5' 5\" (1.651 m) 241 lb (109.3 kg) SpO2 BMI OB Status Smoking Status 96% 40.1 kg/m2 Postmenopausal Current Some Day Smoker Vitals History BMI and BSA Data Body Mass Index Body Surface Area  
 40.1 kg/m 2 2.24 m 2 Preferred Pharmacy Pharmacy Name Phone [facility] [address] [phone] Your Updated Medication List  
  
   
This list is accurate as of: 1/8/18 11:00 AM.  Always use your most recent med list.  
  
  
  
  
 fluticasone-vilanterol 200-25 mcg/dose inhaler Commonly known as:  BREO ELLIPTA Take 1 Puff by inhalation daily. furosemide 20 mg tablet Commonly known as:  LASIX Take 1 Tab by mouth daily. levalbuterol 1.25 mg/3 mL Nebu Commonly known as:  XOPENEX  
USE ONE VIAL IN NEBULIZER THREE TIMES DAILY AS NEEDED  
  
 losartan 25 mg tablet Commonly known as:  COZAAR Take 1 Tab by mouth daily. For blood pressure Nebulizer & Compressor machine Machine and supplies for as needed nebulizer treatments. Dx: P81.0 PROAIR HFA 90 mcg/actuation inhaler Generic drug:  albuterol INHALE ONE PUFF BY MOUTH EVERY 4 HOURS AS NEEDED FOR WHEEZING SITagliptin-metFORMIN  mg per tablet Commonly known as:  Adriana Potash Take 1 Tab by mouth two (2) times daily (with meals). For diabetes  
  
 umeclidinium 62.5 mcg/actuation inhaler Commonly known as:  INCRUSE ELLIPTA Take 1 Puff by inhalation daily. Indications: PREVENTION OF BRONCHOSPASMS WITH EMPHYSEMA We Performed the Following HEMOGLOBIN A1C WITH EAG [19951 CPT(R)]  DIABETES FOOT EXAM [HM7 Custom] LIPID PANEL [62899 CPT(R)] METABOLIC PANEL, COMPREHENSIVE [63780 CPT(R)] MICROALBUMIN, UR, RAND W/ MICROALBUMIN/CREA RATIO [id number] CPT(R)] Follow-up Instructions Return in about 3 months (around 4/8/2018) for diabetes follow up. To-Do List   
 01/08/2018 Imaging:  FRANSISCO MAMMO BI SCREENING INCL CAD Patient Instructions Body Mass Index: Care Instructions Your Care Instructions Body mass index (BMI) can help you see if your weight is raising your risk for health problems. It uses a formula to compare how much you weigh with how tall you are. · A BMI lower than 18.5 is considered underweight. · A BMI between 18.5 and 24.9 is considered healthy. · A BMI between 25 and 29.9 is considered overweight. A BMI of 30 or higher is considered obese. If your BMI is in the normal range, it means that you have a lower risk for weight-related health problems. If your BMI is in the overweight or obese range, you may be at increased risk for weight-related health problems, such as high blood pressure, heart disease, stroke, arthritis or joint pain, and diabetes. If your BMI is in the underweight range, you may be at increased risk for health problems such as fatigue, lower protection (immunity) against illness, muscle loss, bone loss, hair loss, and hormone problems. BMI is just one measure of your risk for weight-related health problems. You may be at higher risk for health problems if you are not active, you eat an unhealthy diet, or you drink too much alcohol or use tobacco products. Follow-up care is a key part of your treatment and safety. Be sure to make and go to all appointments, and call your doctor if you are having problems. It's also a good idea to know your test results and keep a list of the medicines you take. How can you care for yourself at home? · Practice healthy eating habits. This includes eating plenty of fruits, vegetables, whole grains, lean protein, and low-fat dairy. · If your doctor recommends it, get more exercise.  Walking is a good choice. Bit by bit, increase the amount you walk every day. Try for at least 30 minutes on most days of the week. · Do not smoke. Smoking can increase your risk for health problems. If you need help quitting, talk to your doctor about stop-smoking programs and medicines. These can increase your chances of quitting for good. · Limit alcohol to 2 drinks a day for men and 1 drink a day for women. Too much alcohol can cause health problems. If you have a BMI higher than 25 · Your doctor may do other tests to check your risk for weight-related health problems. This may include measuring the distance around your waist. A waist measurement of more than 40 inches in men or 35 inches in women can increase the risk of weight-related health problems. · Talk with your doctor about steps you can take to stay healthy or improve your health. You may need to make lifestyle changes to lose weight and stay healthy, such as changing your diet and getting regular exercise. If you have a BMI lower than 18.5 · Your doctor may do other tests to check your risk for health problems. · Talk with your doctor about steps you can take to stay healthy or improve your health. You may need to make lifestyle changes to gain or maintain weight and stay healthy, such as getting more healthy foods in your diet and doing exercises to build muscle. Where can you learn more? Go to http://jennie-ross.info/. Enter S176 in the search box to learn more about \"Body Mass Index: Care Instructions. \" Current as of: October 13, 2016 Content Version: 11.4 © 9392-6719 Healthwise, [employer]. Care instructions adapted under license by [employer] (which disclaims liability or warranty for this information). If you have questions about a medical condition or this instruction, always ask your healthcare professional. [place] [address] any warranty or liability for your use of this information. Introducing [facility] & HEALTH SERVICES! Dear Rita Lundborg: Thank you for requesting a [employer] account. Our records indicate that you already have an active [employer] account. You can access your account anytime at [email or web address] Did you know that you can access your hospital and ER discharge instructions at any time in [employer]? You can also review all of your test results from your hospital stay or ER visit. Additional Information If you have questions, please visit the Frequently Asked Questions section of the [employer] website at [email or web address] Remember, [employer] is NOT to be used for urgent needs. For medical emergencies, dial 911. Now available from your iPhone and Android! Please provide this summary of care documentation to your next provider. Your primary care clinician is listed as Sariah Mccarthy. If you have any questions after today's visit, please call [phone].

## 2018-01-08 NOTE — LETTER
NOTIFICATION RETURN TO WORK  
 
1/8/2018 10:52 AM 
 
Ms. Bhavna Fine [address] [address] To Whom It May Concern: 
 
Bhavna Fine is currently under the care of CHARLIE Cat 53. She will return to work  on: 4/8/18 If there are questions or concerns please have the patient contact our office. Sincerely, Elizabeth Jensen MD

## 2018-01-08 NOTE — PROGRESS NOTES
HISTORY OF PRESENT ILLNESS  Winslow Tisha Sotelo is a 62 y.o. female. Blood pressure 132/62, pulse 90, temperature 97.4 °F (36.3 °C), temperature source Oral, resp. rate 18, height 5' 5\" (1.651 m), weight 241 lb (109.3 kg), SpO2 96 %. Body mass index is 40.1 kg/(m^2). Chief Complaint   Patient presents with    Diabetes    COPD        HPI  Poonam Sotelo 62 y.o. female  presents to the office today for COPD and diabetes follow up. COPD: Pt states that she saw Dr. Edita English (Pulmonology) today who did a CT of her chest. She notes that she was told by pulmonology that she should change from [facility] to a different inhaler, but cannot remember the name. She was also told to take Singulair, but has not started taking it yet. Pt notes that she had a nebulizer treatment at the pulmonologist which helped her breathing. Hypertension: Bp at office today 132/62. Pt continues with Losartan 25mg daily. Bp control stable, continue current regimen. DM2: A1c was 7.0% on 09/21/17, elevated from A1c of 6.7% on 04/12/17. Pt continues with Janumet 50-500mg BID. Pt states that she checks her blood sugars at home, but they have not been consistent because she does not follow a diet. She notes that her sugars are usually higher in the morning. Pt reports that she bought an exercise bike to try to increase her daily exercise. Advised pt to work on her diet and exercise. Will get an A1c today. Health maintenance: Pt reports that she had allergy testing done and is trying to make changes around her house to reduce any potential exposure to allergens. Advised pt that she is due for a mammogram and will order one for the pt to schedule. Pt reports that she had an eye exam in 04/17, but can't remember who with. She states that she had a colonoscopy with Dr. Loren Cid (GI) 1 week ago that was remarkable for several small polyps and fistulas.  Pt reports that she feels like she is dehydrated even though she tries to drink more water. Advised pt to try Biotene mouth wash and toothpaste. Pt has asked if there is anyone to go to for callus removal. Advised pt to follow up with her regular podiatrist.        Current Outpatient Prescriptions   Medication Sig Dispense Refill    PROAIR HFA 90 mcg/actuation inhaler INHALE ONE PUFF BY MOUTH EVERY 4 HOURS AS NEEDED FOR WHEEZING 1 Inhaler 5    umeclidinium (INCRUSE ELLIPTA) 62.5 mcg/actuation inhaler Take 1 Puff by inhalation daily. Indications: PREVENTION OF BRONCHOSPASMS WITH EMPHYSEMA (Patient taking differently: Take 1 Puff by inhalation daily. Takes qod  Indications: PREVENTION OF BRONCHOSPASMS WITH EMPHYSEMA) 1 Inhaler 5    SITagliptin-metFORMIN (JANUMET)  mg per tablet Take 1 Tab by mouth two (2) times daily (with meals). For diabetes 60 Tab 5    fluticasone-vilanterol (BREO ELLIPTA) 200-25 mcg/dose inhaler Take 1 Puff by inhalation daily. 1 Inhaler 5    losartan (COZAAR) 25 mg tablet Take 1 Tab by mouth daily. For blood pressure 90 Tab 1    levalbuterol (XOPENEX) 1.25 mg/3 mL nebu USE ONE VIAL IN NEBULIZER THREE TIMES DAILY AS NEEDED 75 Nebule 5    furosemide (LASIX) 20 mg tablet Take 1 Tab by mouth daily. (Patient taking differently: Take 20 mg by mouth as needed.) 30 Tab 5    Nebulizer & Compressor machine Machine and supplies for as needed nebulizer treatments. Dx: J44.9 1 Each 0     Allergies   Allergen Reactions    Pcn [Penicillins] Anaphylaxis    Clindamycin Itching     SWELLING & ITCHING IN VAGINAL AREA. Past Medical History:   Diagnosis Date    Anxiety     Arthralgia     Asthma     Asthma     Chronic obstructive pulmonary disease ([facility] [address]     PFT 8/2015    Crack cocaine use     quit; used for 8 years    Diabetes ([facility] [address]     Hemorrhoid     Psychiatric disorder     ANXIETY.     Sleep apnea, obstructive 2016    Tobacco use      Past Surgical History:   Procedure Laterality Date    ENDOSCOPY, COLON, DIAGNOSTIC  2008    F/U 5 years Dr. Blanca Portillo  HX CHOLECYSTECTOMY      HX GI      COLONOSCOPY X 1    HX HEENT      WISDOM TEETH EXTRACTION. Family History   Problem Relation Age of Onset    Cancer Mother      colon   [facility] Diabetes Father     Hypertension Father      Social History   Substance Use Topics    Smoking status: Current Some Day Smoker     Packs/day: 0.25     Years: 22.00     Types: Cigarettes    Smokeless tobacco: Never Used      Comment: smokes about 2-3  cigarettes/day    Alcohol use No        Review of Systems   Constitutional: Negative. Negative for malaise/fatigue. Eyes: Negative for blurred vision. Respiratory: Negative for shortness of breath. Cardiovascular: Negative for chest pain and leg swelling. Musculoskeletal: Negative. Neurological: Negative. Negative for dizziness and headaches. All other systems reviewed and are negative. Physical Exam   Constitutional: She is oriented to person, place, and time. She appears well-developed and well-nourished. No distress. HENT:   Head: Normocephalic and atraumatic. Neck: Carotid bruit is not present. Cardiovascular: Normal rate, regular rhythm, normal heart sounds and intact distal pulses. Exam reveals no gallop and no friction rub. No murmur heard. Pulmonary/Chest: Effort normal. No respiratory distress. She has decreased breath sounds (shallow). She has wheezes (scattered). She has no rales. Musculoskeletal: She exhibits no edema. Neurological: She is alert and oriented to person, place, and time. Skin: She is not diaphoretic. Psychiatric: She has a normal mood and affect. Her behavior is normal. Judgment and thought content normal.   Nursing note and vitals reviewed.     Diabetic foot exam:     Left: Reflexes 2+     Vibratory sensation normal    Proprioception normal   Sharp/dull discrimination normal    Filament test normal sensation with micro filament   Pulse DP: 2+ (normal)   Pulse PT: 2+ (normal)   Deformities: None  Right: Reflexes 2+   Vibratory sensation normal   Proprioception normal   Sharp/dull discrimination normal   Filament test normal sensation with micro filament   Pulse DP: 2+ (normal)   Pulse PT: 2+ (normal)   Deformities: None   ASSESSMENT and PLAN  Diagnoses and all orders for this visit:    1. Type 2 diabetes mellitus without complication, without long-term current use of insulin (HCC)  Assessment & Plan:  Stable, based on history, physical exam and review of pertinent labs, studies and medications; meds reconciled; lifestyle modifications recommended, medication compliance emphasized. Key Antihyperglycemic Medications             SITagliptin-metFORMIN (JANUMET)  mg per tablet  (Taking) Take 1 Tab by mouth two (2) times daily (with meals). For diabetes        Other Key Diabetic Medications             losartan (COZAAR) 25 mg tablet  (Taking) Take 1 Tab by mouth daily. For blood pressure        Lab Results   Component Value Date/Time    Hemoglobin A1c 7.0 09/21/2017 11:57 AM    Hemoglobin A1c (POC) 7.1 01/27/2017 05:15 PM    Glucose 146 09/21/2017 11:57 AM    Creatinine 0.72 09/21/2017 11:57 AM    Microalb/Creat ratio (ug/mg creat.) 5.6 04/12/2017 10:52 AM    Cholesterol, total 128 04/12/2017 10:51 AM    HDL Cholesterol 31 04/12/2017 10:51 AM    LDL, calculated 61 04/12/2017 10:51 AM    Triglyceride 178 04/12/2017 10:51 AM     Diabetic Foot and Eye Exam  Status   Topic Date Due    Eye Exam  07/21/2015    Diabetic Foot Care  10/02/2018       Orders:  -     METABOLIC PANEL, COMPREHENSIVE  -     HEMOGLOBIN A1C WITH EAG  -     MICROALBUMIN, UR, RAND W/ MICROALBUMIN/CREA RATIO  -      DIABETES FOOT EXAM    2. Low HDL (under 40)  -     METABOLIC PANEL, COMPREHENSIVE  -     LIPID PANEL  - Presumed stable, will assess levels today. 3. Asthma COPD overlap syndrome(HCC)  Assessment & Plan:   This condition is managed by Specialist.  Key COPD Medications             PROAIR HFA 90 mcg/actuation inhaler  (Taking) INHALE ONE PUFF BY MOUTH EVERY 4 HOURS AS NEEDED FOR WHEEZING    umeclidinium (INCRUSE ELLIPTA) 62.5 mcg/actuation inhaler  (Taking) Take 1 Puff by inhalation daily. Indications: PREVENTION OF BRONCHOSPASMS WITH EMPHYSEMA    fluticasone-vilanterol (BREO ELLIPTA) 200-25 mcg/dose inhaler  (Taking) Take 1 Puff by inhalation daily. levalbuterol (XOPENEX) 1.25 mg/3 mL nebu  (Taking) USE ONE VIAL IN NEBULIZER THREE TIMES DAILY AS NEEDED        Lab Results   Component Value Date/Time    WBC 8.0 09/21/2017 11:57 AM    HGB 13.7 09/21/2017 11:57 AM    HCT 42.0 09/21/2017 11:57 AM    PLATELET 030 67/20/1763 11:57 AM         4. Tobacco use disorder         - The patient was counseled on the dangers of tobacco use, and was advised to quit. Reviewed strategies to maximize success, including removing cigarettes and smoking materials from environment. 5. Obesity, Class III, BMI 40-49.9 (morbid obesity) ([facility] [address]  Assessment & Plan:  Uncontrolled, based on history, physical exam and review of pertinent labs, studies and medications; meds reconciled; lifestyle modifications recommended. Key Obesity Meds             furosemide (LASIX) 20 mg tablet  (Taking) Take 1 Tab by mouth daily. Lab Results   Component Value Date/Time    Hemoglobin A1c 7.0 09/21/2017 11:57 AM    Glucose 146 09/21/2017 11:57 AM    Cholesterol, total 128 04/12/2017 10:51 AM    HDL Cholesterol 31 04/12/2017 10:51 AM    LDL, calculated 61 04/12/2017 10:51 AM    Triglyceride 178 04/12/2017 10:51 AM    Sodium 145 09/21/2017 11:57 AM    Potassium 3.5 09/21/2017 11:57 AM    ALT (SGPT) 18 09/21/2017 11:57 AM    AST (SGOT) 7 09/21/2017 11:57 AM       6. Screening for malignant neoplasm of breast  -     FRANSISCO MAMMOGRAM SCREENING DIGITAL BILAT; Future  - Advised pt that she is due for a mammogram and will order one for the pt to schedule. 7. Dry mouth        - Advised pt to try Biotene mouth wash and toothpaste.      8. Pre-ulcerative corn or callous        - Advised pt to follow up with her regular podiatrist.     Follow-up Disposition:  Return in about 3 months (around 4/8/2018) for diabetes follow up. Medication risks/benefits/costs/interactions/alternatives discussed with patient. Advised patient to call back or return to office if symptoms worsen/change/persist.  If patient cannot reach us or should anything more severe/urgent arise he/she should proceed directly to the nearest emergency department. Discussed expected course/resolution/complications of diagnosis in detail with patient. Patient given a written after visit summary which includes her diagnoses, current medications and vitals. Patient expressed understanding with the diagnosis and plan. Written by susu Wheatley, as dictated by Diomedes Hadley M.D.   I have reviewed and agree with the above note and have made corrections where appropriate, Dr. Kervin Martinez MD

## 2018-01-08 NOTE — ASSESSMENT & PLAN NOTE
This condition is managed by Specialist.  Key COPD Medications             PROAIR HFA 90 mcg/actuation inhaler  (Taking) INHALE ONE PUFF BY MOUTH EVERY 4 HOURS AS NEEDED FOR WHEEZING    umeclidinium (INCRUSE ELLIPTA) 62.5 mcg/actuation inhaler  (Taking) Take 1 Puff by inhalation daily. Indications: PREVENTION OF BRONCHOSPASMS WITH EMPHYSEMA    fluticasone-vilanterol (BREO ELLIPTA) 200-25 mcg/dose inhaler  (Taking) Take 1 Puff by inhalation daily.     levalbuterol (XOPENEX) 1.25 mg/3 mL nebu  (Taking) USE ONE VIAL IN NEBULIZER THREE TIMES DAILY AS NEEDED        Lab Results   Component Value Date/Time    WBC 8.0 09/21/2017 11:57 AM    HGB 13.7 09/21/2017 11:57 AM    HCT 42.0 09/21/2017 11:57 AM    PLATELET 793 61/63/0328 11:57 AM

## 2018-01-08 NOTE — ASSESSMENT & PLAN NOTE
Uncontrolled, based on history, physical exam and review of pertinent labs, studies and medications; meds reconciled; lifestyle modifications recommended. Key Obesity Meds             furosemide (LASIX) 20 mg tablet  (Taking) Take 1 Tab by mouth daily.         Lab Results   Component Value Date/Time    Hemoglobin A1c 7.0 09/21/2017 11:57 AM    Glucose 146 09/21/2017 11:57 AM    Cholesterol, total 128 04/12/2017 10:51 AM    HDL Cholesterol 31 04/12/2017 10:51 AM    LDL, calculated 61 04/12/2017 10:51 AM    Triglyceride 178 04/12/2017 10:51 AM    Sodium 145 09/21/2017 11:57 AM    Potassium 3.5 09/21/2017 11:57 AM    ALT (SGPT) 18 09/21/2017 11:57 AM    AST (SGOT) 7 09/21/2017 11:57 AM

## 2018-01-08 NOTE — ASSESSMENT & PLAN NOTE
Stable, based on history, physical exam and review of pertinent labs, studies and medications; meds reconciled; lifestyle modifications recommended, medication compliance emphasized. Key Antihyperglycemic Medications             SITagliptin-metFORMIN (JANUMET)  mg per tablet  (Taking) Take 1 Tab by mouth two (2) times daily (with meals). For diabetes        Other Key Diabetic Medications             losartan (COZAAR) 25 mg tablet  (Taking) Take 1 Tab by mouth daily.  For blood pressure        Lab Results   Component Value Date/Time    Hemoglobin A1c 7.0 09/21/2017 11:57 AM    Hemoglobin A1c (POC) 7.1 01/27/2017 05:15 PM    Glucose 146 09/21/2017 11:57 AM    Creatinine 0.72 09/21/2017 11:57 AM    Microalb/Creat ratio (ug/mg creat.) 5.6 04/12/2017 10:52 AM    Cholesterol, total 128 04/12/2017 10:51 AM    HDL Cholesterol 31 04/12/2017 10:51 AM    LDL, calculated 61 04/12/2017 10:51 AM    Triglyceride 178 04/12/2017 10:51 AM     Diabetic Foot and Eye Exam HM Status   Topic Date Due    Eye Exam  07/21/2015    Diabetic Foot Care  10/02/2018

## 2018-01-08 NOTE — PATIENT INSTRUCTIONS
Body Mass Index: Care Instructions  Your Care Instructions    Body mass index (BMI) can help you see if your weight is raising your risk for health problems. It uses a formula to compare how much you weigh with how tall you are. · A BMI lower than 18.5 is considered underweight. · A BMI between 18.5 and 24.9 is considered healthy. · A BMI between 25 and 29.9 is considered overweight. A BMI of 30 or higher is considered obese. If your BMI is in the normal range, it means that you have a lower risk for weight-related health problems. If your BMI is in the overweight or obese range, you may be at increased risk for weight-related health problems, such as high blood pressure, heart disease, stroke, arthritis or joint pain, and diabetes. If your BMI is in the underweight range, you may be at increased risk for health problems such as fatigue, lower protection (immunity) against illness, muscle loss, bone loss, hair loss, and hormone problems. BMI is just one measure of your risk for weight-related health problems. You may be at higher risk for health problems if you are not active, you eat an unhealthy diet, or you drink too much alcohol or use tobacco products. Follow-up care is a key part of your treatment and safety. Be sure to make and go to all appointments, and call your doctor if you are having problems. It's also a good idea to know your test results and keep a list of the medicines you take. How can you care for yourself at home? · Practice healthy eating habits. This includes eating plenty of fruits, vegetables, whole grains, lean protein, and low-fat dairy. · If your doctor recommends it, get more exercise. Walking is a good choice. Bit by bit, increase the amount you walk every day. Try for at least 30 minutes on most days of the week. · Do not smoke. Smoking can increase your risk for health problems. If you need help quitting, talk to your doctor about stop-smoking programs and medicines. These can increase your chances of quitting for good. · Limit alcohol to 2 drinks a day for men and 1 drink a day for women. Too much alcohol can cause health problems. If you have a BMI higher than 25  · Your doctor may do other tests to check your risk for weight-related health problems. This may include measuring the distance around your waist. A waist measurement of more than 40 inches in men or 35 inches in women can increase the risk of weight-related health problems. · Talk with your doctor about steps you can take to stay healthy or improve your health. You may need to make lifestyle changes to lose weight and stay healthy, such as changing your diet and getting regular exercise. If you have a BMI lower than 18.5  · Your doctor may do other tests to check your risk for health problems. · Talk with your doctor about steps you can take to stay healthy or improve your health. You may need to make lifestyle changes to gain or maintain weight and stay healthy, such as getting more healthy foods in your diet and doing exercises to build muscle. Where can you learn more? Go to http://jennie-ross.info/. Enter S176 in the search box to learn more about \"Body Mass Index: Care Instructions. \"  Current as of: October 13, 2016  Content Version: 11.4  © 1148-7028 [employer]. Care instructions adapted under license by [employer] (which disclaims liability or warranty for this information). If you have questions about a medical condition or this instruction, always ask your healthcare professional. [address] any warranty or liability for your use of this information.

## 2018-01-09 LAB
ALBUMIN/CREAT UR: 7.4 MG/G CREAT (ref 0–30)
CREAT UR-MCNC: 151 MG/DL
MICROALBUMIN UR-MCNC: 11.2 UG/ML

## 2018-01-10 LAB
ALBUMIN SERPL-MCNC: 4 G/DL (ref 3.5–5.5)
ALBUMIN/GLOB SERPL: 1.4 {RATIO} (ref 1.2–2.2)
ALP SERPL-CCNC: 95 IU/L (ref 39–117)
ALT SERPL-CCNC: 28 IU/L (ref 0–32)
AST SERPL-CCNC: 18 IU/L (ref 0–40)
BILIRUB SERPL-MCNC: 0.3 MG/DL (ref 0–1.2)
BUN SERPL-MCNC: 14 MG/DL (ref 6–24)
BUN/CREAT SERPL: 22 (ref 9–23)
CALCIUM SERPL-MCNC: 9.5 MG/DL (ref 8.7–10.2)
CHLORIDE SERPL-SCNC: 100 MMOL/L (ref 96–106)
CHOLEST SERPL-MCNC: 124 MG/DL (ref 100–199)
CO2 SERPL-SCNC: 25 MMOL/L (ref 18–29)
CREAT SERPL-MCNC: 0.63 MG/DL (ref 0.57–1)
EST. AVERAGE GLUCOSE BLD GHB EST-MCNC: 226 MG/DL
GLOBULIN SER CALC-MCNC: 2.8 G/DL (ref 1.5–4.5)
GLUCOSE SERPL-MCNC: 157 MG/DL (ref 65–99)
HBA1C MFR BLD: 9.5 % (ref 4.8–5.6)
HDLC SERPL-MCNC: 28 MG/DL
INTERPRETATION, 910389: NORMAL
LDLC SERPL CALC-MCNC: 41 MG/DL (ref 0–99)
POTASSIUM SERPL-SCNC: 3.9 MMOL/L (ref 3.5–5.2)
PROT SERPL-MCNC: 6.8 G/DL (ref 6–8.5)
SODIUM SERPL-SCNC: 143 MMOL/L (ref 134–144)
SPECIMEN STATUS REPORT, ROLRST: NORMAL
TRIGL SERPL-MCNC: 274 MG/DL (ref 0–149)
VLDLC SERPL CALC-MCNC: 55 MG/DL (ref 5–40)

## 2018-01-17 ENCOUNTER — TELEPHONE (OUTPATIENT)
Dept: FAMILY MEDICINE CLINIC | Age: 58
End: 2018-01-17

## 2018-01-17 ENCOUNTER — HOSPITAL ENCOUNTER (OUTPATIENT)
Dept: PULMONOLOGY | Age: 58
Discharge: HOME | End: 2018-01-17
Payer: COMMERCIAL

## 2018-01-17 DIAGNOSIS — J45.909 ASTHMATIC BRONCHITIS: ICD-10-CM

## 2018-01-17 LAB
ARTERIAL PATENCY WRIST A: YES
BASE EXCESS BLD CALC-SCNC: 2 MMOL/L
BDY SITE: ABNORMAL
GAS FLOW.O2 O2 DELIVERY SYS: ABNORMAL L/MIN
HCO3 BLD-SCNC: 26.8 MMOL/L (ref 22–26)
O2/TOTAL GAS SETTING VFR VENT: 21 %
PCO2 BLD: 43.2 MMHG (ref 35–45)
PH BLD: 7.4 [PH] (ref 7.35–7.45)
PO2 BLD: 60 MMHG (ref 80–100)
SAO2 % BLD: 90 % (ref 92–97)
SPECIMEN TYPE: ABNORMAL

## 2018-01-17 PROCEDURE — 36600 WITHDRAWAL OF ARTERIAL BLOOD: CPT

## 2018-01-17 PROCEDURE — 82803 BLOOD GASES ANY COMBINATION: CPT

## 2018-01-17 NOTE — TELEPHONE ENCOUNTER
[phone] spoke to Margaux Horton has appointment tonight and was wondering if we can reschedule her due to snow stor  per Dr Saucedo Reading ok to book for 1/20/2018 since he is working that patient has appointment 1/20/2018 at [facility] patient understand

## 2018-01-17 NOTE — TELEPHONE ENCOUNTER
----- Message from Jennifer Metcalf sent at 1/17/2018  8:36 AM EST -----  Regarding: Dr. Wil Jon Telephone  Patient returning a call back,think name was Christopher Cameron. Contact is [phone].

## 2018-01-18 ENCOUNTER — HOSPITAL ENCOUNTER (OUTPATIENT)
Age: 58
Setting detail: OUTPATIENT SURGERY
Discharge: HOME | End: 2018-01-18
Attending: INTERNAL MEDICINE | Admitting: INTERNAL MEDICINE
Payer: COMMERCIAL

## 2018-01-18 VITALS
WEIGHT: 239 LBS | HEART RATE: 82 BPM | SYSTOLIC BLOOD PRESSURE: 112 MMHG | TEMPERATURE: 98.2 F | RESPIRATION RATE: 13 BRPM | HEIGHT: 66 IN | BODY MASS INDEX: 38.41 KG/M2 | DIASTOLIC BLOOD PRESSURE: 34 MMHG | OXYGEN SATURATION: 94 %

## 2018-01-18 LAB
APPEARANCE FLD: ABNORMAL
COLOR FLD: COLORLESS
EOSINOPHIL NFR FLD MANUAL: 1 %
GLUCOSE BLD STRIP.AUTO-MCNC: 117 MG/DL (ref 65–100)
KOH PREP SPEC: NORMAL
LYMPHOCYTES NFR FLD: 7 %
MONOS+MACROS NFR FLD: 36 %
NEUTROPHILS NFR FLD: 3 %
NUC CELL # FLD: 56 /CU MM (ref 0–5)
OTHER CELL,FOTHC: 53 %
RBC # FLD: >100 /CU MM
SERVICE CMNT-IMP: ABNORMAL
SERVICE CMNT-IMP: NORMAL
SPECIMEN SOURCE FLD: ABNORMAL

## 2018-01-18 PROCEDURE — 74011250636 HC RX REV CODE- 250/636

## 2018-01-18 PROCEDURE — 86356 MONONUCLEAR CELL ANTIGEN: CPT | Performed by: INTERNAL MEDICINE

## 2018-01-18 PROCEDURE — 74011000250 HC RX REV CODE- 250: Performed by: INTERNAL MEDICINE

## 2018-01-18 PROCEDURE — 82962 GLUCOSE BLOOD TEST: CPT

## 2018-01-18 PROCEDURE — 76040000019: Performed by: INTERNAL MEDICINE

## 2018-01-18 PROCEDURE — 74011000250 HC RX REV CODE- 250

## 2018-01-18 PROCEDURE — 87116 MYCOBACTERIA CULTURE: CPT | Performed by: INTERNAL MEDICINE

## 2018-01-18 PROCEDURE — 87070 CULTURE OTHR SPECIMN AEROBIC: CPT | Performed by: INTERNAL MEDICINE

## 2018-01-18 PROCEDURE — 87102 FUNGUS ISOLATION CULTURE: CPT | Performed by: INTERNAL MEDICINE

## 2018-01-18 PROCEDURE — 74011250636 HC RX REV CODE- 250/636: Performed by: INTERNAL MEDICINE

## 2018-01-18 PROCEDURE — 89050 BODY FLUID CELL COUNT: CPT | Performed by: INTERNAL MEDICINE

## 2018-01-18 PROCEDURE — 87210 SMEAR WET MOUNT SALINE/INK: CPT | Performed by: INTERNAL MEDICINE

## 2018-01-18 PROCEDURE — 88112 CYTOPATH CELL ENHANCE TECH: CPT | Performed by: INTERNAL MEDICINE

## 2018-01-18 RX ORDER — MIDAZOLAM HYDROCHLORIDE 1 MG/ML
INJECTION, SOLUTION INTRAMUSCULAR; INTRAVENOUS
Status: DISPENSED
Start: 2018-01-18 | End: 2018-01-19

## 2018-01-18 RX ORDER — LIDOCAINE HYDROCHLORIDE 10 MG/ML
20 INJECTION INFILTRATION; PERINEURAL ONCE
Status: COMPLETED | OUTPATIENT
Start: 2018-01-18 | End: 2018-01-18

## 2018-01-18 RX ORDER — FENTANYL CITRATE 50 UG/ML
25 INJECTION, SOLUTION INTRAMUSCULAR; INTRAVENOUS
Status: ACTIVE | OUTPATIENT
Start: 2018-01-18 | End: 2018-01-18

## 2018-01-18 RX ORDER — ALBUTEROL SULFATE 0.83 MG/ML
SOLUTION RESPIRATORY (INHALATION)
Status: COMPLETED
Start: 2018-01-18 | End: 2018-01-18

## 2018-01-18 RX ORDER — LIDOCAINE HYDROCHLORIDE 20 MG/ML
18 INJECTION, SOLUTION INFILTRATION; PERINEURAL ONCE
Status: COMPLETED | OUTPATIENT
Start: 2018-01-18 | End: 2018-01-18

## 2018-01-18 RX ORDER — LIDOCAINE HYDROCHLORIDE 20 MG/ML
JELLY TOPICAL ONCE
Status: DISCONTINUED | OUTPATIENT
Start: 2018-01-18 | End: 2018-01-18 | Stop reason: HOSPADM

## 2018-01-18 RX ORDER — LIDOCAINE HYDROCHLORIDE 20 MG/ML
5 SOLUTION OROPHARYNGEAL ONCE
Status: DISCONTINUED | OUTPATIENT
Start: 2018-01-18 | End: 2018-01-18 | Stop reason: HOSPADM

## 2018-01-18 RX ORDER — SODIUM CHLORIDE 9 MG/ML
50 INJECTION, SOLUTION INTRAVENOUS CONTINUOUS
Status: DISCONTINUED | OUTPATIENT
Start: 2018-01-18 | End: 2018-01-18 | Stop reason: HOSPADM

## 2018-01-18 RX ORDER — LIDOCAINE HYDROCHLORIDE 10 MG/ML
INJECTION INFILTRATION; PERINEURAL
Status: DISPENSED
Start: 2018-01-18 | End: 2018-01-18

## 2018-01-18 RX ORDER — SODIUM CHLORIDE 0.9 % (FLUSH) 0.9 %
5-10 SYRINGE (ML) INJECTION EVERY 8 HOURS
Status: DISCONTINUED | OUTPATIENT
Start: 2018-01-18 | End: 2018-01-18 | Stop reason: HOSPADM

## 2018-01-18 RX ORDER — SODIUM CHLORIDE 0.9 % (FLUSH) 0.9 %
5-10 SYRINGE (ML) INJECTION AS NEEDED
Status: DISCONTINUED | OUTPATIENT
Start: 2018-01-18 | End: 2018-01-18 | Stop reason: HOSPADM

## 2018-01-18 RX ORDER — LIDOCAINE HYDROCHLORIDE 20 MG/ML
INJECTION, SOLUTION INFILTRATION; PERINEURAL
Status: DISPENSED
Start: 2018-01-18 | End: 2018-01-18

## 2018-01-18 RX ORDER — BENZONATATE 100 MG/1
100 CAPSULE ORAL
Status: DISCONTINUED | OUTPATIENT
Start: 2018-01-18 | End: 2018-01-18 | Stop reason: HOSPADM

## 2018-01-18 RX ORDER — FLUMAZENIL 0.1 MG/ML
0.2 INJECTION INTRAVENOUS
Status: DISCONTINUED | OUTPATIENT
Start: 2018-01-18 | End: 2018-01-18 | Stop reason: HOSPADM

## 2018-01-18 RX ORDER — FENTANYL CITRATE 50 UG/ML
INJECTION, SOLUTION INTRAMUSCULAR; INTRAVENOUS
Status: DISPENSED
Start: 2018-01-18 | End: 2018-01-19

## 2018-01-18 RX ORDER — NALOXONE HYDROCHLORIDE 0.4 MG/ML
0.1 INJECTION, SOLUTION INTRAMUSCULAR; INTRAVENOUS; SUBCUTANEOUS
Status: DISCONTINUED | OUTPATIENT
Start: 2018-01-18 | End: 2018-01-18 | Stop reason: HOSPADM

## 2018-01-18 RX ADMIN — ALBUTEROL SULFATE 2.5 MG: 2.5 SOLUTION RESPIRATORY (INHALATION) at 12:57

## 2018-01-18 RX ADMIN — FENTANYL CITRATE 25 MCG: 50 INJECTION, SOLUTION INTRAMUSCULAR; INTRAVENOUS at 12:26

## 2018-01-18 RX ADMIN — LIDOCAINE HYDROCHLORIDE 360 MG: 20 INJECTION, SOLUTION INFILTRATION; PERINEURAL at 12:39

## 2018-01-18 RX ADMIN — LIDOCAINE HYDROCHLORIDE 20 ML: 10 INJECTION, SOLUTION INFILTRATION; PERINEURAL at 12:30

## 2018-01-18 RX ADMIN — FENTANYL CITRATE 25 MCG: 50 INJECTION, SOLUTION INTRAMUSCULAR; INTRAVENOUS at 12:24

## 2018-01-18 RX ADMIN — SODIUM CHLORIDE 50 ML/HR: 900 INJECTION, SOLUTION INTRAVENOUS at 12:00

## 2018-01-18 NOTE — PROCEDURES
[facility]  Bronchoscopy Report    Procedure: Diagnostic bronchoscopy. Indication: Abnormal chest imaging    Consent/Treatment: Informed consent was obtained from the  patient after risks, benefits and alternatives were explained. Timeout verified the correct patient and correct procedure. Anesthesia:   Topical sedation to nares, mouth, and tracheobronchial tree with lidocaine  Moderate sedation with Fentanyl 50 mcg and Versed 2mg was used    Moderate ( conscious ) sedation was administered by the endoscopy nurse and supervised by the endoscopist. The following parameters were monitored: oxygen saturation, heart rate, blood pressure, respiratory rate, EKG, adequacy of pulmonary ventilation and response to care. Total physician intraservice time was 30 min    Procedure Details:   -- The bronchoscope was introduced orally with use of a bite block. -- The vocal cords were found to be normal.  -- The trachea and gerber were completely inspected and were found to be normal.  -- The right-sided endobronchial anatomy was completely inspected and was found to be normal.  -- The left-sided endobronchial anatomy was completely inspected and was found to be normal.     Specimens:    The bronchoscope was wedged in the RML medial and bronchoalveolar lavage was performed; material was sent for  microbiology, cytology, AFB smear and culture, fungal culture and cell count and diff    Rapid On-Site Evaluation: A preliminary diagnosis of 1) normal VC, tracheall, b/l segemental exam 2) RUL patent all segments 3)  BALF from RML cloudy no blood    Complications: none    Estimated Blood Loss: Minimal    Catherine Camejo MD

## 2018-01-18 NOTE — PROGRESS NOTES
Xin Mcfarlane  1960  [id number]    Situation:  Verbal report received from: Chloe  Procedure: Procedure(s):  BRONCHOSCOPY  Please bring to REsp Dept, not to Endo thank you.    BRONCHIAL ALVEOLAR LAVAGE    Background:    Preoperative diagnosis: Abnormal chest x-ray [R93.8]  Postoperative diagnosis: 1.- Shortness of Breath  2.- Inerstitial Lung Disease    :  Mayte Alvarado  Assistant(s): Endoscopy RN-1: Winston Womack RN    Specimens: * BAL      Assessment:  Intra-procedure medications   Versed 2 mg  Fentanyl 25 mcg      Intravenous fluids: NS@ KVO     Vital signs stable    Abdominal assessment: round and soft     Recommendation:  Discharge patient per MD order    Family or Friend  Permission to share finding with family or friend yes  Noted Inspiratory expiratory wheezes  Xopenex Nebs started with 5 litrs oxygen

## 2018-01-18 NOTE — H&P
PCCM  Pt seen and examined. Risks and benfits discussed of bronch and pt wishes to proceed with procedure. Impression:  Smoker  ILD- possible RBILD  RUL ATX- ? Endobronchial lesion    Plan:  BAL and possible endobronchial bx.

## 2018-01-18 NOTE — DISCHARGE INSTRUCTIONS
Name: [place] Leticia   : 1960   MRN: [id number]   Date: 2018 12:43 PM            BRONCHOSCOPY / EUS / EBUS DISCHARGE INSTRUCTIONS  Discomfort:  Sore throat- throat lozenges or warm salt water gargle  redness at IV site- apply warm compress to area; if redness or soreness persist- contact your physician  Gaseous discomfort- walking, belching will help relieve any discomfort  Should not operate a vehicle for at least 12 hours  You should not engage in an occupation involving machinery or appliances for rest of today  You should not drink alcoholic beverages for at least 12 hours  Avoid making any critical decisions for at least 24 hour  Blood tinged secretions - this should stop within 2-3 hours  DIET  Nothing by mouth- do not eat or drink for two hours. You may eat and drink after 5 pm   You may resume your regular diet - however -  remember your colon is empty and a heavy meal will    produce gas. Avoid these foods:  vegetables, fried / greasy foods, carbonated drinks  MEDICATIONS:      ACTIVITY  You may resume your normal daily activities however it is recommended that you spend the remainder of the day resting -  avoid any strenuous activity. CALL M.D. ANY SIGN OF   Increasing pain, nausea, vomiting  New increased bleeding  Fever (chills)  Pain in chest area  Bloody discharge from nose or mouth  Shortness of breath  Bubbles under the skin around the collarbone. These may crackle and pop when you press on them. Coughing up more than ½ cup of blood. Call [phone] office for the following  Results of procedure  in 7 days  Appointment in 7 days  Telephone #  [phone]  Additional instructions: If you have not heard the results of your test by next friday please call the phone number listed above.

## 2018-01-18 NOTE — IP AVS SNAPSHOT
2700 [facility] [address] [place] [address] 
[phone] Patient: Uli Eastman MRN: [id number] :1960 About your hospitalization You were admitted on:  2018 You last received care in the:  [facility] ENDOSCOPY You were discharged on:  2018 Why you were hospitalized Your primary diagnosis was:  Not on File Follow-up Information None Your Scheduled Appointments 2018 10:00 AM EST  
ROUTINE CARE with Antonia Cheung MD  
403 [facility] ([address] [place] [address]) [address]  
[phone] Discharge Orders None A check blanca indicates which time of day the medication should be taken. My Medications ASK your doctor about these medications Instructions Each Dose to Equal  
 Morning Noon Evening Bedtime  
 fluticasone-vilanterol 200-25 mcg/dose inhaler Commonly known as:  BREO ELLIPTA Your last dose was: Your next dose is: Take 1 Puff by inhalation daily. 1 Puff  
    
   
   
   
  
 furosemide 20 mg tablet Commonly known as:  LASIX Your last dose was: Your next dose is: Take 1 Tab by mouth daily. 20 mg  
    
   
   
   
  
 levalbuterol 1.25 mg/3 mL Nebu Commonly known as:  Willadean Anes Your last dose was: Your next dose is:    
   
   
 USE ONE VIAL IN NEBULIZER THREE TIMES DAILY AS NEEDED  
     
   
   
   
  
 lidocaine HCl 3 % topical cream  
Commonly known as:  XYLOCAINE Your last dose was: Your next dose is:    
   
   
 Apply  to affected area two (2) times a day. losartan 25 mg tablet Commonly known as:  COZAAR Your last dose was: Your next dose is: Take 1 Tab by mouth daily. For blood pressure 25 mg Nebulizer & Compressor machine Your last dose was: Your next dose is:    
   
   
 Machine and supplies for as needed nebulizer treatments. Dx: X03.3 PROAIR HFA 90 mcg/actuation inhaler Generic drug:  albuterol Your last dose was: Your next dose is:    
   
   
 INHALE ONE PUFF BY MOUTH EVERY 4 HOURS AS NEEDED FOR WHEEZING SITagliptin-metFORMIN  mg per tablet Commonly known as:  Benuel Carota Your last dose was: Your next dose is: Take 1 Tab by mouth two (2) times daily (with meals). For diabetes 1 Tab  
    
   
   
   
  
 umeclidinium 62.5 mcg/actuation inhaler Commonly known as:  INCRUSE ELLIPTA Your last dose was: Your next dose is: Take 1 Puff by inhalation daily. Indications: PREVENTION OF BRONCHOSPASMS WITH EMPHYSEMA  
 1 Puff Discharge Instructions Name: Clif Brian : 1960 MRN: [id number] Date: 2018 12:43 PM  
 
    
 
BRONCHOSCOPY / EUS / EBUS DISCHARGE INSTRUCTIONS Discomfort: 
Sore throat- throat lozenges or warm salt water gargle 
redness at IV site- apply warm compress to area; if redness or soreness persist- contact your physician Gaseous discomfort- walking, belching will help relieve any discomfort Should not operate a vehicle for at least 12 hours You should not engage in an occupation involving machinery or appliances for rest of today You should not drink alcoholic beverages for at least 12 hours Avoid making any critical decisions for at least 24 hour Blood tinged secretions  this should stop within 2-3 hours DIET Nothing by mouth- do not eat or drink for two hours. You may eat and drink after 5 pm 
 You may resume your regular diet  however -  remember your colon is empty and a heavy meal will  
 produce gas. Avoid these foods:  vegetables, fried / greasy foods, carbonated drinks MEDICATIONS: 
 
 
ACTIVITY You may resume your normal daily activities however it is recommended that you spend the remainder of the day resting -  avoid any strenuous activity. CALL M.D. ANY SIGN OF Increasing pain, nausea, vomiting New increased bleeding Fever (chills) Pain in chest area Bloody discharge from nose or mouth Shortness of breath Bubbles under the skin around the collarbone. These may crackle and pop when you press on them. Coughing up more than ½ cup of blood. Call physicians office for the following Results of procedure  in 7 days Appointment in 7 days Telephone #  [phone] Additional instructions: If you have not heard the results of your test by next friday please call the phone number listed above. Introducing \[facility]\"" & HEALTH SERVICES! Dear Leonor Bravo: Thank you for requesting a [employer] account. Our records indicate that you already have an active [employer] account. You can access your account anytime at [email or web address] Did you know that you can access your hospital and ER discharge instructions at any time in [employer]? You can also review all of your test results from your hospital stay or ER visit. Additional Information If you have questions, please visit the Frequently Asked Questions section of the [employer] website at [email or web address] Remember, [employer] is NOT to be used for urgent needs. For medical emergencies, dial 911. Now available from your iPhone and Android! Unresulted Labs-Please follow up with your PCP about these lab tests Order Current Status AFB CULTURE + SMEAR W/RFLX ID FROM CULTURE In process CELL COUNT, BODY FLUID In process CULTURE, FUNGUS In process CULTURE, RESPIRATORY/SPUTUM/BRONCH W GRAM STAIN In process GRAM STAIN In process FABIANA, OTHER SOURCES In process LYMPH SUBSET, BRONCHOALVEOLAR LAVAGE In process Providers Seen During Your Hospitalization Provider Specialty Primary office phone Salena Damon MD Pulmonary Disease [phone] Your Primary Care Physician (PCP) Primary Care Physician Office Phone Office Fax Shirley Olvera [phone] You are allergic to the following Allergen Reactions Pcn (Penicillins) Anaphylaxis Clindamycin Itching SWELLING & ITCHING IN VAGINAL AREA. Recent Documentation Height Weight BMI OB Status Smoking Status 1.676 m 108.4 kg 38.58 kg/m2 Postmenopausal Current Some Day Smoker Emergency Contacts Name Discharge Info Relation Home Work Mobile Piero Teresa DISCHARGE CAREGIVER [3] Other Relative [6] [phone]    
 Olvin VALDOVINOS  Friend [5] [phone] Smith Moe  Friend [5] [phone] Patient Belongings The following personal items are in your possession at time of discharge: 
  Dental Appliances: Partials, Uppers  Visual Aid: Glasses Please provide this summary of care documentation to your next provider. Signatures-by signing, you are acknowledging that this After Visit Summary has been reviewed with you and you have received a copy. Patient Signature:  ____________________________________________________________ Date:  ____________________________________________________________  
  
Jeremias Delgado Provider Signature:  ____________________________________________________________ Date:  ____________________________________________________________

## 2018-01-20 ENCOUNTER — OFFICE VISIT (OUTPATIENT)
Dept: FAMILY MEDICINE CLINIC | Age: 58
End: 2018-01-20

## 2018-01-20 VITALS
BODY MASS INDEX: 38.89 KG/M2 | TEMPERATURE: 98.5 F | WEIGHT: 242 LBS | OXYGEN SATURATION: 99 % | HEART RATE: 78 BPM | DIASTOLIC BLOOD PRESSURE: 69 MMHG | SYSTOLIC BLOOD PRESSURE: 122 MMHG | HEIGHT: 66 IN | RESPIRATION RATE: 18 BRPM

## 2018-01-20 DIAGNOSIS — E66.01 OBESITY, CLASS III, BMI 40-49.9 (MORBID OBESITY) (HCC): ICD-10-CM

## 2018-01-20 DIAGNOSIS — E11.65 UNCONTROLLED TYPE 2 DIABETES MELLITUS WITH HYPERGLYCEMIA, WITHOUT LONG-TERM CURRENT USE OF INSULIN (HCC): Primary | ICD-10-CM

## 2018-01-20 DIAGNOSIS — F17.200 TOBACCO USE DISORDER: ICD-10-CM

## 2018-01-20 DIAGNOSIS — J44.9 OBSTRUCTIVE CHRONIC BRONCHITIS WITHOUT EXACERBATION (HCC): ICD-10-CM

## 2018-01-20 LAB
BACTERIA SPEC CULT: NORMAL
GRAM STN SPEC: NORMAL
GRAM STN SPEC: NORMAL
SERVICE CMNT-IMP: NORMAL

## 2018-01-20 NOTE — MR AVS SNAPSHOT
Sherry Mayo 
 
 
 [address] [facility] [address] 
[phone] Patient: June Ball MRN: [id number] :1960 Visit Information Date & Time Provider Department Dept. Phone Encounter #  
 2018 10:00 AM Rodney Daniel MD [facility] [phone] [id number] Follow-up Instructions Return in about 1 month (around 2018) for diabetes follow up. Your Appointments 2018 10:45 AM  
ROUTINE CARE with Rodney Daniel MD  
[facility]) Appt Note: 3 months follow up visit DM  
 [address] [phone]  
[phone]  
  
   
 [address] [address] Upcoming Health Maintenance Date Due  
 EYE EXAM RETINAL OR DILATED Q1 2015 BREAST CANCER SCRN MAMMOGRAM 2018 HEMOGLOBIN A1C Q6M 2018 FOOT EXAM Q1 2019 MICROALBUMIN Q1 2019 LIPID PANEL Q1 2019 COLONOSCOPY 1/10/2019 PAP AKA CERVICAL CYTOLOGY 2020 DTaP/Tdap/Td series (2 - Td) 2020 Allergies as of 2018  Review Complete On: 2018 By: Rebecca Smith LPN Severity Noted Reaction Type Reactions Pcn [Penicillins] High 2010    Anaphylaxis Clindamycin  2010    Itching SWELLING & ITCHING IN VAGINAL AREA. Current Immunizations  Reviewed on 10/2/2017 Name Date Influenza Vaccine (Quad) PF 10/2/2017, 10/2/2015 Influenza Vaccine Split 2010 Pneumococcal Conjugate (PCV-13) 10/2/2017 Pneumococcal Vaccine (Unspecified Type) 2009 TDAP Vaccine 2010 Not reviewed this visit You Were Diagnosed With   
  
 Codes Comments Uncontrolled type 2 diabetes mellitus with hyperglycemia, without long-term current use of insulin ([facility]    -  Primary ICD-10-CM: E11.65 ICD-9-CM: 250.02  Obesity, Class III, BMI 40-49.9 (morbid obesity) (HCC)     ICD-10-CM: E66.01 
 ICD-9-CM: 278.01 Obstructive chronic bronchitis without exacerbation ([facility]     ICD-10-CM: J44.9 ICD-9-CM: 491.20 Tobacco use disorder     ICD-10-CM: F17.200 ICD-9-CM: 305.1 Vitals BP Pulse Temp Resp Height(growth percentile) Weight(growth percentile) 122/69 (BP 1 Location: Left arm, BP Patient Position: Sitting) 78 98.5 °F (36.9 °C) (Oral) 18 5' 6\" (1.676 m) 242 lb (109.8 kg) SpO2 BMI OB Status Smoking Status 99% 39.06 kg/m2 Postmenopausal Current Some Day Smoker Vitals History BMI and BSA Data Body Mass Index Body Surface Area 39.06 kg/m 2 2.26 m 2 Preferred Pharmacy Pharmacy Name Phone [address] [phone] Your Updated Medication List  
  
   
This list is accurate as of: 1/20/18 10:50 AM.  Always use your most recent med list.  
  
  
  
  
 dulaglutide 0.75 mg/0.5 mL sub-q pen Commonly known as:  TRULICITY  
0.5 mL by SubCUTAneous route every seven (7) days. fluticasone-vilanterol 200-25 mcg/dose inhaler Commonly known as:  BREO ELLIPTA Take 1 Puff by inhalation daily. furosemide 20 mg tablet Commonly known as:  LASIX Take 1 Tab by mouth daily. levalbuterol 1.25 mg/3 mL Nebu Commonly known as:  XOPENEX  
USE ONE VIAL IN NEBULIZER THREE TIMES DAILY AS NEEDED  
  
 lidocaine HCl 3 % topical cream  
Commonly known as:  XYLOCAINE Apply  to affected area two (2) times a day. losartan 25 mg tablet Commonly known as:  COZAAR Take 1 Tab by mouth daily. For blood pressure Nebulizer & Compressor machine Machine and supplies for as needed nebulizer treatments. Dx: W88.8 PROAIR HFA 90 mcg/actuation inhaler Generic drug:  albuterol INHALE ONE PUFF BY MOUTH EVERY 4 HOURS AS NEEDED FOR WHEEZING SITagliptin-metFORMIN  mg per tablet Commonly known as:  Zelpha Sciara  
 Take 1 Tab by mouth two (2) times daily (with meals). For diabetes  
  
 umeclidinium 62.5 mcg/actuation inhaler Commonly known as:  INCRUSE ELLIPTA Take 1 Puff by inhalation daily. Indications: PREVENTION OF BRONCHOSPASMS WITH EMPHYSEMA Prescriptions Sent to Pharmacy Refills  
 dulaglutide (TRULICITY) 5.06 HS/8.7 mL sub-q pen 1 Si.5 mL by SubCUTAneous route every seven (7) days. Class: Normal  
 Pharmacy: [facility] DR MECHE JEAN BAPTISTE [facility], [address] [facility] #: [phone] Route: SubCUTAneous We Performed the Following REFERRAL TO DIABETIC EDUCATION [REF20 Custom] Follow-up Instructions Return in about 1 month (around 2018) for diabetes follow up. To-Do List   
 2018 1:15 PM  
  Appointment with [facility] 1 at [address] [place] [address] ([phone]) Shower or bathe using soap and water. Do not use deodorant, powder, perfumes, or lotion the day of your exam.  If your prior mammograms were not performed at [facility] 6 please bring films with you or forward prior images 2 days before your procedure. Check in at registration 15min before your appointment time unless you were instructed to do otherwise. A script is not necessary, but if you have one, please bring it on the day of the mammogram or have it faxed to the department. [facility] [phone] [facility]  [phone] [facility] 19 A[place]NDA  [phone] Critical access hospital [phone] [address] [phone] Referral Information Referral ID Referred By Referred To  
  
 [id number] Shell Colunga Not Available Visits Status Start Date End Date 1 New Request 18 If your referral has a status of pending review or denied, additional information will be sent to support the outcome of this decision. Patient Instructions Stopping Smoking: Care Instructions Your Care Instructions Cigarette smokers crave the nicotine in cigarettes. Giving it up is much harder than simply changing a habit. Your body has to stop craving the nicotine. It is hard to quit, but you can do it. There are many tools that people use to quit smoking. You may find that combining tools works best for you. There are several steps to quitting. First you get ready to quit. Then you get support to help you. After that, you learn new skills and behaviors to become a nonsmoker. For many people, a necessary step is getting and using medicine. Your doctor will help you set up the plan that best meets your needs. You may want to attend a smoking cessation program to help you quit smoking. When you choose a program, look for one that has proven success. Ask your doctor for ideas. You will greatly increase your chances of success if you take medicine as well as get counseling or join a cessation program. 
Some of the changes you feel when you first quit tobacco are uncomfortable. Your body will miss the nicotine at first, and you may feel short-tempered and grumpy. You may have trouble sleeping or concentrating. Medicine can help you deal with these symptoms. You may struggle with changing your smoking habits and rituals. The last step is the tricky one: Be prepared for the smoking urge to continue for a time. This is a lot to deal with, but keep at it. You will feel better. Follow-up care is a key part of your treatment and safety. Be sure to make and go to all appointments, and call your doctor if you are having problems. It's also a good idea to know your test results and keep a list of the medicines you take. How can you care for yourself at home? · Ask your family, friends, and coworkers for support. You have a better chance of quitting if you have help and support. · Join a support group, such as Nicotine Anonymous, for people who are trying to quit smoking. · Consider signing up for a smoking cessation program, such as the American Lung Association's Freedom from Smoking program. 
· Set a quit date. Pick your date carefully so that it is not right in the middle of a big deadline or stressful time. Once you quit, do not even take a puff. Get rid of all ashtrays and lighters after your last cigarette. Clean your house and your clothes so that they do not smell of smoke. · Learn how to be a nonsmoker. Think about ways you can avoid those things that make you reach for a cigarette. ¨ Avoid situations that put you at greatest risk for smoking. For some people, it is hard to have a drink with friends without smoking. For others, they might skip a coffee break with coworkers who smoke. ¨ Change your daily routine. Take a different route to work or eat a meal in a different place. · Cut down on stress. Calm yourself or release tension by doing an activity you enjoy, such as reading a book, taking a hot bath, or gardening. · Talk to your doctor or pharmacist about nicotine replacement therapy, which replaces the nicotine in your body. You still get nicotine but you do not use tobacco. Nicotine replacement products help you slowly reduce the amount of nicotine you need. These products come in several forms, many of them available over-the-counter: ¨ Nicotine patches ¨ Nicotine gum and lozenges ¨ Nicotine inhaler · Ask your doctor about bupropion (Wellbutrin) or varenicline (Chantix), which are prescription medicines. They do not contain nicotine. They help you by reducing withdrawal symptoms, such as stress and anxiety. · Some people find hypnosis, acupuncture, and massage helpful for ending the smoking habit. · Eat a healthy diet and get regular exercise. Having healthy habits will help your body move past its craving for nicotine. · Be prepared to keep trying.  Most people are not successful the first few times they try to quit. Do not get mad at yourself if you smoke again. Make a list of things you learned and think about when you want to try again, such as next week, next month, or next year. Where can you learn more? Go to http://jennie-ross.info/. Enter U526 in the search box to learn more about \"Stopping Smoking: Care Instructions. \" Current as of: March 20, 2017 Content Version: 11.4 © 1699-8775 [employer]. Care instructions adapted under license by [employer] (which disclaims liability or warranty for this information). If you have questions about a medical condition or this instruction, always ask your healthcare professional. [address] any warranty or liability for your use of this information. Introducing [facility] & HEALTH SERVICES! Dear Lucía Cheatham: Thank you for requesting a [employer] account. Our records indicate that you already have an active [employer] account. You can access your account anytime at [email or web address] Did you know that you can access your hospital and ER discharge instructions at any time in [employer]? You can also review all of your test results from your hospital stay or ER visit. Additional Information If you have questions, please visit the Frequently Asked Questions section of the [employer] website at [email or web address] Remember, [employer] is NOT to be used for urgent needs. For medical emergencies, dial 911. Now available from your iPhone and Android! Please provide this summary of care documentation to your next provider. Your primary care clinician is listed as Misty Wu. If you have any questions after today's visit, please call [phone].

## 2018-01-20 NOTE — PROGRESS NOTES
HISTORY OF PRESENT ILLNESS  Zoe Jeffers is a 62 y.o. female. Blood pressure 122/69, pulse 78, temperature 98.5 °F (36.9 °C), temperature source Oral, resp. rate 18, height 5' 6\" (1.676 m), weight 242 lb (109.8 kg), SpO2 99 %. Body mass index is 39.06 kg/(m^2). Chief Complaint   Patient presents with    Abnormal Lab Results     high A1C        HPI  Poonam Teresa 62 y.o. female  presents to the office today for abnormal lab follow up. DM2: A1c was 9.5% on 01/08/18, elevated from A1c of 7.0% on 09/21/17. Pt continues with Janumet 50-500mg BID. Pt states that she has been turning to food for comfort with her pulmonary issues. Advised pt that her sugars are out of control now. Discussed with pt that I want to start her on Trulicity 2.5KP every 7 days and have her keep a log of her blood sugars and diet at home. Pt has asked if there is a diabetes education course that she can be referred to. Advised pt that I will put in a referral for a diabetes education course. Discussed with pt that her elevated blood sugars are causing an increase in her cholesterol and triglycerides. Health maintenance: Pt states that she saw Dr. Arielle Iraheta (Pulmonology) for a bronchoscopy. Pt's preliminary results were unremarkable with no malignancy noted. Advised pt to wait for further results. Also strongly advised pt that she needs to quit smoking because it is killing her. Pt states that she has been using an exercise bike at home. Current Outpatient Prescriptions   Medication Sig Dispense Refill    dulaglutide (TRULICITY) 9.97 VH/2.1 mL sub-q pen 0.5 mL by SubCUTAneous route every seven (7) days. 5 Pen 1    lidocaine HCl (XYLOCAINE) 3 % topical cream Apply  to affected area two (2) times a day. 85 g 1    PROAIR HFA 90 mcg/actuation inhaler INHALE ONE PUFF BY MOUTH EVERY 4 HOURS AS NEEDED FOR WHEEZING 1 Inhaler 5    umeclidinium (INCRUSE ELLIPTA) 62.5 mcg/actuation inhaler Take 1 Puff by inhalation daily. Indications: PREVENTION OF BRONCHOSPASMS WITH EMPHYSEMA (Patient taking differently: Take 1 Puff by inhalation daily. Takes qod  Indications: PREVENTION OF BRONCHOSPASMS WITH EMPHYSEMA) 1 Inhaler 5    SITagliptin-metFORMIN (JANUMET)  mg per tablet Take 1 Tab by mouth two (2) times daily (with meals). For diabetes 60 Tab 5    fluticasone-vilanterol (BREO ELLIPTA) 200-25 mcg/dose inhaler Take 1 Puff by inhalation daily. 1 Inhaler 5    losartan (COZAAR) 25 mg tablet Take 1 Tab by mouth daily. For blood pressure 90 Tab 1    levalbuterol (XOPENEX) 1.25 mg/3 mL nebu USE ONE VIAL IN NEBULIZER THREE TIMES DAILY AS NEEDED 75 Nebule 5    furosemide (LASIX) 20 mg tablet Take 1 Tab by mouth daily. (Patient taking differently: Take 20 mg by mouth as needed.) 30 Tab 5    Nebulizer & Compressor machine Machine and supplies for as needed nebulizer treatments. Dx: J44.9 1 Each 0     Allergies   Allergen Reactions    Pcn [Penicillins] Anaphylaxis    Clindamycin Itching     SWELLING & ITCHING IN VAGINAL AREA. Past Medical History:   Diagnosis Date    Anxiety     Arthralgia     Asthma     Asthma     Chronic obstructive pulmonary disease ([address]     PFT 8/2015    Crack cocaine use     quit; used for 8 years    Diabetes ([address]     Hemorrhoid     Psychiatric disorder     ANXIETY.  Sleep apnea, obstructive 2016    Tobacco use      Past Surgical History:   Procedure Laterality Date    ENDOSCOPY, COLON, DIAGNOSTIC  2008    F/U 5 years Dr. Alves Bon HX GI      COLONOSCOPY X 1    HX HEENT      WISDOM TEETH EXTRACTION.      Family History   Problem Relation Age of Onset    Cancer Mother      colon    Diabetes Father     Hypertension Father      Social History   Substance Use Topics    Smoking status: Current Some Day Smoker     Packs/day: 0.25     Years: 22.00     Types: Cigarettes    Smokeless tobacco: Never Used      Comment: smokes about 2-3  cigarettes/day    Alcohol use No Review of Systems   Constitutional: Negative. Negative for malaise/fatigue. Eyes: Negative for blurred vision. Respiratory: Negative for shortness of breath. Cardiovascular: Negative for chest pain and leg swelling. Musculoskeletal: Negative. Neurological: Negative. Negative for dizziness and headaches. All other systems reviewed and are negative. Physical Exam   Constitutional: She is oriented to person, place, and time. She appears well-developed and well-nourished. No distress. HENT:   Head: Normocephalic and atraumatic. Neck: Carotid bruit is not present. Cardiovascular: Normal rate, regular rhythm, normal heart sounds and intact distal pulses. Exam reveals no gallop and no friction rub. No murmur heard. Pulmonary/Chest: Effort normal. No respiratory distress. She has no wheezes. She has rales (bibasilar). Musculoskeletal: She exhibits no edema. Neurological: She is alert and oriented to person, place, and time. Skin: She is not diaphoretic. Psychiatric: She has a normal mood and affect. Her behavior is normal. Judgment and thought content normal.   Nursing note and vitals reviewed. ASSESSMENT and PLAN  Diagnoses and all orders for this visit:    1. Uncontrolled type 2 diabetes mellitus with hyperglycemia, without long-term current use of insulin (HCC)  -     dulaglutide (TRULICITY) 5.36 YH/9.3 mL sub-q pen; 0.5 mL by SubCUTAneous route every seven (7) days.  -     REFERRAL TO DIABETIC EDUCATION  - A1c was 9.5% on 01/08/18, elevated from A1c of 7.0% on 09/21/17. Pt continues with Janumet 50-500mg BID. - Advised pt that her sugars are out of control now. - Discussed with pt that I want to start her on Trulicity 7.0CT every 7 days and have her keep a log of her blood sugars and diet at home. - Advised pt that I will put in a referral for a diabetes education course.    - Discussed with pt that her elevated blood sugars are causing an increase in her cholesterol and triglycerides. 2. Obesity, Class III, BMI 40-49.9 (morbid obesity) ([facility]         - I have reviewed/discussed the above normal BMI with the patient. I have recommended the following interventions: dietary management education, guidance, and counseling, encourage exercise and monitor weight . 3. Asthma COPD overlap syndrome(HCC)          - Advised pt to continue to follow up with  Dr. Valentino Cipro (Pulmonology). 4. Tobacco use disorder          - The patient was counseled on the dangers of tobacco use, and was advised to quit. Reviewed strategies to maximize success, including removing cigarettes and smoking materials from environment. Follow-up Disposition:  Return in about 1 month (around 2/20/2018) for diabetes follow up. Medication risks/benefits/costs/interactions/alternatives discussed with patient. Advised patient to call back or return to office if symptoms worsen/change/persist.  If patient cannot reach us or should anything more severe/urgent arise he/she should proceed directly to the nearest emergency department. Discussed expected course/resolution/complications of diagnosis in detail with patient. Patient given a written after visit summary which includes her diagnoses, current medications and vitals. Patient expressed understanding with the diagnosis and plan. Written by susu Meyer, as dictated by Kelly Bee M.D.   I have reviewed and agree with the above note and have made corrections where appropriate, Dr. Alise Davidson MD

## 2018-01-20 NOTE — PATIENT INSTRUCTIONS

## 2018-01-20 NOTE — PROGRESS NOTES
Chief Complaint   Patient presents with    Abnormal Lab Results     high A1C       Reviewed Record in preparation for visit and have obtained necessary documentation. Identified pt with two pt identifiers (Name @ )    Health Maintenance Due   Topic    EYE EXAM RETINAL OR DILATED Q1     BREAST CANCER SCRN MAMMOGRAM          1. Have you been to the ER, urgent care clinic since your last visit? Hospitalized since your last visit? No    2. Have you seen or consulted any other health care providers outside of the [address] [place] [address] since your last visit? Include any pap smears or colon screening.  No

## 2018-01-21 LAB
CD3 CELLS # SPEC: 69 %
CD3+CD4+ CELLS # BLD: 32 %
CD4:CD8 RATIO, C48RBT: 0.89 RATIO
CD8, CD8BT: 36 %
SPECIMEN SOURCE: NORMAL

## 2018-01-25 ENCOUNTER — TELEPHONE (OUTPATIENT)
Dept: DIABETES SERVICES | Age: 58
End: 2018-01-25

## 2018-01-25 ENCOUNTER — TELEPHONE (OUTPATIENT)
Dept: FAMILY MEDICINE CLINIC | Age: 58
End: 2018-01-25

## 2018-02-19 ENCOUNTER — OFFICE VISIT (OUTPATIENT)
Dept: FAMILY MEDICINE CLINIC | Age: 58
End: 2018-02-19

## 2018-02-19 VITALS
WEIGHT: 238 LBS | TEMPERATURE: 97.5 F | OXYGEN SATURATION: 97 % | SYSTOLIC BLOOD PRESSURE: 131 MMHG | HEIGHT: 66 IN | BODY MASS INDEX: 38.25 KG/M2 | HEART RATE: 78 BPM | DIASTOLIC BLOOD PRESSURE: 78 MMHG | RESPIRATION RATE: 18 BRPM

## 2018-02-19 DIAGNOSIS — E11.65 UNCONTROLLED TYPE 2 DIABETES MELLITUS WITH HYPERGLYCEMIA, WITHOUT LONG-TERM CURRENT USE OF INSULIN (HCC): Primary | ICD-10-CM

## 2018-02-19 DIAGNOSIS — F17.200 TOBACCO USE DISORDER: ICD-10-CM

## 2018-02-19 DIAGNOSIS — J44.9 OBSTRUCTIVE CHRONIC BRONCHITIS WITHOUT EXACERBATION (HCC): ICD-10-CM

## 2018-02-19 DIAGNOSIS — J84.115 RESPIRATORY BRONCHIOLITIS INTERSTITIAL LUNG DISEASE (HCC): ICD-10-CM

## 2018-02-19 DIAGNOSIS — E66.01 OBESITY, CLASS III, BMI 40-49.9 (MORBID OBESITY) (HCC): ICD-10-CM

## 2018-02-19 LAB
BACTERIA SPEC CULT: NORMAL
SERVICE CMNT-IMP: NORMAL

## 2018-02-19 RX ORDER — LOSARTAN POTASSIUM 25 MG/1
25 TABLET ORAL DAILY
Qty: 90 TAB | Refills: 1 | Status: SHIPPED | OUTPATIENT
Start: 2018-02-19

## 2018-02-19 RX ORDER — ALBUTEROL SULFATE 90 UG/1
2 AEROSOL, METERED RESPIRATORY (INHALATION)
Qty: 1 INHALER | Refills: 5 | Status: SHIPPED | OUTPATIENT
Start: 2018-02-19

## 2018-02-19 NOTE — PROGRESS NOTES
HISTORY OF PRESENT ILLNESS  Thomasena League Gina Gottron is a 62 y.o. female. Blood pressure 131/78, pulse 78, temperature 97.5 °F (36.4 °C), temperature source Oral, resp. rate 18, height 5' 6\" (1.676 m), weight 238 lb (108 kg), SpO2 97 %. Body mass index is 38.41 kg/(m^2). Chief Complaint   Patient presents with    Diabetes     1 month fu        HPI  Poonam Teresa 62 y.o. female  presents to the office today for diabetes follow up. DM2: A1c was 9.5% on 01/08/18, elevated from A1c of 7.0% on 09/21/17. Pt continues with Trulicity 8.4FV every 7 days, Janumet 50-500mg. Pt reports that she has been using the Trulicity for 3 weeks and her sugars are averaging around 130. Advised pt that we will increase her Trulicity to 9.7SP every 7 days. Hypertension: Bp at office today 131/78. Pt continues with Losartan 25mg daily. Bp control stable, continue current regimen. Health maintenance: Pt reports that she has not been smoking for the past 2 weeks. She notes she is feeling stressed out from quitting smoking and she is currently moving. Pt states that she needs to follow up with Dr. Alona Pérez (Pulmonary disease) for her COPD. Current Outpatient Prescriptions   Medication Sig Dispense Refill    dulaglutide (TRULICITY) 1.5 PE/6.8 mL sub-q pen 0.5 mL by SubCUTAneous route every seven (7) days. 4 Pen 5    albuterol (PROAIR HFA) 90 mcg/actuation inhaler Take 2 Puffs by inhalation every six (6) hours as needed for Wheezing. 1 Inhaler 5    SITagliptin-metFORMIN (JANUMET)  mg per tablet Take 1 Tab by mouth two (2) times daily (with meals). For diabetes 60 Tab 5    losartan (COZAAR) 25 mg tablet Take 1 Tab by mouth daily. For blood pressure 90 Tab 1    mometasone-formoterol (DULERA) 200-5 mcg/actuation HFA inhaler Take 2 Puffs by inhalation two (2) times a day. 1 Inhaler 5    lidocaine HCl (XYLOCAINE) 3 % topical cream Apply  to affected area two (2) times a day.  85 g 1    umeclidinium (INCRUSE ELLIPTA) 62.5 mcg/actuation inhaler Take 1 Puff by inhalation daily. Indications: PREVENTION OF BRONCHOSPASMS WITH EMPHYSEMA (Patient taking differently: Take 1 Puff by inhalation daily. Takes qod  Indications: PREVENTION OF BRONCHOSPASMS WITH EMPHYSEMA) 1 Inhaler 5    fluticasone-vilanterol (BREO ELLIPTA) 200-25 mcg/dose inhaler Take 1 Puff by inhalation daily. 1 Inhaler 5    levalbuterol (XOPENEX) 1.25 mg/3 mL nebu USE ONE VIAL IN NEBULIZER THREE TIMES DAILY AS NEEDED 75 Nebule 5    furosemide (LASIX) 20 mg tablet Take 1 Tab by mouth daily. (Patient taking differently: Take 20 mg by mouth as needed.) 30 Tab 5    Nebulizer & Compressor machine Machine and supplies for as needed nebulizer treatments. Dx: J44.9 1 Each 0     Allergies   Allergen Reactions    Pcn [Penicillins] Anaphylaxis    Clindamycin Itching     SWELLING & ITCHING IN VAGINAL AREA. Past Medical History:   Diagnosis Date    Anxiety     Arthralgia     Asthma     Asthma     Chronic obstructive pulmonary disease ([facility]     PFT 8/2015    Crack cocaine use     quit; used for 8 years    Diabetes ([facility]     Hemorrhoid     Psychiatric disorder     ANXIETY.  Sleep apnea, obstructive 2016    Tobacco use      Past Surgical History:   Procedure Laterality Date    ENDOSCOPY, COLON, DIAGNOSTIC  2008    F/U 5 years Dr. Leona Murrell HX GI      COLONOSCOPY X 1    HX HEENT      WISDOM TEETH EXTRACTION. Family History   Problem Relation Age of Onset    Cancer Mother      colon   Mustafa Stai Diabetes Father     Hypertension Father      Social History   Substance Use Topics    Smoking status: Current Some Day Smoker     Packs/day: 0.25     Years: 22.00     Types: Cigarettes    Smokeless tobacco: Never Used      Comment: smokes about 2-3  cigarettes/day    Alcohol use No        Review of Systems   Constitutional: Negative. Negative for malaise/fatigue. Eyes: Negative for blurred vision. Respiratory: Negative for shortness of breath. Cardiovascular: Negative for chest pain and leg swelling. Musculoskeletal: Negative. Neurological: Negative. Negative for dizziness and headaches. All other systems reviewed and are negative. Physical Exam   Constitutional: She is oriented to person, place, and time. She appears well-developed and well-nourished. No distress. HENT:   Head: Normocephalic and atraumatic. Neck: Carotid bruit is not present. Cardiovascular: Normal rate, regular rhythm, normal heart sounds and intact distal pulses. Exam reveals no gallop and no friction rub. No murmur heard. Pulmonary/Chest: Effort normal and breath sounds normal. No respiratory distress. She has no wheezes. She has no rales. Musculoskeletal: She exhibits no edema. Neurological: She is alert and oriented to person, place, and time. Skin: She is not diaphoretic. Psychiatric: She has a normal mood and affect. Her behavior is normal. Judgment and thought content normal.   Nursing note and vitals reviewed. ASSESSMENT and PLAN  Diagnoses and all orders for this visit:    1. Uncontrolled type 2 diabetes mellitus with hyperglycemia, without long-term current use of insulin (HCC)  -     dulaglutide (TRULICITY) 1.5 RI/4.4 mL sub-q pen; 0.5 mL by SubCUTAneous route every seven (7) days.  -     SITagliptin-metFORMIN (JANUMET)  mg per tablet; Take 1 Tab by mouth two (2) times daily (with meals). For diabetes  -     losartan (COZAAR) 25 mg tablet; Take 1 Tab by mouth daily. For blood pressure  -     METABOLIC PANEL, COMPREHENSIVE  - A1c was 9.5% on 01/08/18, elevated from A1c of 7.0% on 09/21/17. Pt continues with Trulicity 2.6MW every 7 days, Janumet 50-500mg. Pt reports that she has been using the Trulicity for 3 weeks and her sugars are averaging around 130. Advised pt that we will increase her Trulicity to 3.3IH every 7 days.     2. Tobacco use disorder        - The patient was counseled on the dangers of tobacco use, and was advised to quit.  Reviewed strategies to maximize success, including removing cigarettes and smoking materials from environment. 3. Respiratory bronchiolitis interstitial lung disease ([facility]  -     mometasone-formoterol (DULERA) 200-5 mcg/actuation HFA inhaler; Take 2 Puffs by inhalation two (2) times a day. - Pt states that she needs to follow up with Dr. Alyse Soni (Pulmonary disease) for her COPD. 4. Asthma COPD overlap syndrome(HCC)  -     albuterol (PROAIR HFA) 90 mcg/actuation inhaler; Take 2 Puffs by inhalation every six (6) hours as needed for Wheezing.  -     mometasone-formoterol (DULERA) 200-5 mcg/actuation HFA inhaler; Take 2 Puffs by inhalation two (2) times a day. - Pt states that she needs to follow up with Dr. Alyse Soni (Pulmonary disease) for her COPD.     5. Obesity, Class III, BMI 40-49.9 (morbid obesity) ([facility]         - I have reviewed/discussed the above normal BMI with the patient. I have recommended the following interventions: dietary management education, guidance, and counseling, encourage exercise and monitor weight . Follow-up Disposition:  Return in about 7 weeks (around 4/9/2018) for diabetes follow up. .Medication risks/benefits/costs/interactions/alternatives discussed with patient. Advised patient to call back or return to office if symptoms worsen/change/persist.  If patient cannot reach us or should anything more severe/urgent arise he/she should proceed directly to the nearest emergency department. Discussed expected course/resolution/complications of diagnosis in detail with patient. Patient given a written after visit summary which includes her diagnoses, current medications and vitals. Patient expressed understanding with the diagnosis and plan. Written by susu Valdez, as dictated by Luis Enrique Rivas M.D.   I have reviewed and agree with the above note and have made corrections where appropriate, Dr. Laci Marie MD

## 2018-02-19 NOTE — PROGRESS NOTES
Chief Complaint   Patient presents with    Diabetes     1 month fu       Reviewed Record in preparation for visit and have obtained necessary documentation. Identified pt with two pt identifiers (Name @ )    Health Maintenance Due   Topic    EYE EXAM RETINAL OR DILATED Q1     BREAST CANCER SCRN MAMMOGRAM          1. Have you been to the ER, urgent care clinic since your last visit? Hospitalized since your last visit? No    2. Have you seen or consulted any other health care providers outside of the [address] [place] [address] since your last visit? Include any pap smears or colon screening.  No

## 2018-02-19 NOTE — MR AVS SNAPSHOT
[address] [facility] 
 
 
 [address] [place] [address] 
[phone] Patient: June Ball MRN: [id number] :[id number] Visit Information Date & Time Provider Department Dept. Phone Encounter #  
 2018 12:30 PM Jeri Bush  [facility] [phone] [id number] Follow-up Instructions Return in about 7 weeks (around 2018) for diabetes follow up. Your Appointments 2018 10:45 AM  
ROUTINE CARE with Jeri Bush MD  
[facility]) Appt Note: 3 months follow up visit DM  
 [address] [address]  
[phone]  
  
   
 [address] [address] Upcoming Health Maintenance Date Due  
 EYE EXAM RETINAL OR DILATED Q1 2015 BREAST CANCER SCRN MAMMOGRAM 2018 HEMOGLOBIN A1C Q6M 2018 FOOT EXAM Q1 2019 MICROALBUMIN Q1 2019 LIPID PANEL Q1 2019 COLONOSCOPY 1/10/2019 PAP AKA CERVICAL CYTOLOGY 2020 DTaP/Tdap/Td series (2 - Td) 2020 Allergies as of 2018  Review Complete On: 2018 By: Jeri Bush MD  
  
 Severity Noted Reaction Type Reactions Pcn [Penicillins] High 2010    Anaphylaxis Clindamycin  2010    Itching SWELLING & ITCHING IN VAGINAL AREA. Current Immunizations  Reviewed on 10/2/2017 Name Date Influenza Vaccine (Quad) PF 10/2/2017, 10/2/2015 Influenza Vaccine Split 2010 Pneumococcal Conjugate (PCV-13) 10/2/2017 Pneumococcal Vaccine (Unspecified Type) 2009 TDAP Vaccine 2010 Not reviewed this visit You Were Diagnosed With   
  
 Codes Comments Uncontrolled type 2 diabetes mellitus with hyperglycemia, without long-term current use of insulin ([facility]    -  Primary ICD-10-CM: E11.65 ICD-9-CM: 250.02 Tobacco use disorder     ICD-10-CM: F17.200 ICD-9-CM: 305.1 Respiratory bronchiolitis interstitial lung disease ([facility]     ICD-10-CM: Q14.326 
ICD-9-CM: 516.34 Obstructive chronic bronchitis without exacerbation ([facility]     ICD-10-CM: J44.9 ICD-9-CM: 491.20 Obesity, Class III, BMI 40-49.9 (morbid obesity) ([facility])     ICD-10-CM: E66.01 
ICD-9-CM: 278.01 Vitals BP Pulse Temp Resp Height(growth percentile) Weight(growth percentile) 131/78 (BP 1 Location: Right arm, BP Patient Position: Sitting) 78 97.5 °F (36.4 °C) (Oral) 18 5' 6\" (1.676 m) 238 lb (108 kg) SpO2 BMI OB Status Smoking Status 97% 38.41 kg/m2 Postmenopausal Current Some Day Smoker Vitals History BMI and BSA Data Body Mass Index Body Surface Area  
 38.41 kg/m 2 2.24 m 2 Preferred Pharmacy Pharmacy Name Phone [address] [phone] Your Updated Medication List  
  
   
This list is accurate as of: 2/19/18  1:49 PM.  Always use your most recent med list.  
  
  
  
  
 albuterol 90 mcg/actuation inhaler Commonly known as:  PROAIR HFA Take 2 Puffs by inhalation every six (6) hours as needed for Wheezing. dulaglutide 1.5 mg/0.5 mL sub-q pen Commonly known as:  TRULICITY  
0.5 mL by SubCUTAneous route every seven (7) days. fluticasone-vilanterol 200-25 mcg/dose inhaler Commonly known as:  BREO ELLIPTA Take 1 Puff by inhalation daily. furosemide 20 mg tablet Commonly known as:  LASIX Take 1 Tab by mouth daily. levalbuterol 1.25 mg/3 mL Nebu Commonly known as:  XOPENEX  
USE ONE VIAL IN NEBULIZER THREE TIMES DAILY AS NEEDED  
  
 lidocaine HCl 3 % topical cream  
Commonly known as:  XYLOCAINE Apply  to affected area two (2) times a day. losartan 25 mg tablet Commonly known as:  COZAAR Take 1 Tab by mouth daily. For blood pressure Nebulizer & Compressor machine Machine and supplies for as needed nebulizer treatments. Dx: A58.6 SITagliptin-metFORMIN  mg per tablet Commonly known as:  Mica Redmond Take 1 Tab by mouth two (2) times daily (with meals). For diabetes  
  
 umeclidinium 62.5 mcg/actuation inhaler Commonly known as:  INCRUSE ELLIPTA Take 1 Puff by inhalation daily. Indications: PREVENTION OF BRONCHOSPASMS WITH EMPHYSEMA Prescriptions Sent to Pharmacy Refills  
 dulaglutide (TRULICITY) 1.5 SY/2.2 mL sub-q pen 5 Si.5 mL by SubCUTAneous route every seven (7) days. Class: Normal  
 Pharmacy: [facility] DR MECHE JEAN BAPTISTE [address] Ph #: [phone] Route: SubCUTAneous  
 albuterol (PROAIR HFA) 90 mcg/actuation inhaler 5 Sig: Take 2 Puffs by inhalation every six (6) hours as needed for Wheezing. Class: Normal  
 Pharmacy: [facility] DR MECHE ELMOREJANIE [address] Ph #: [phone] Route: Inhalation Follow-up Instructions Return in about 7 weeks (around 2018) for diabetes follow up. To-Do List   
 2018 1:15 PM  
  Appointment with [facility] FRANSISCO 1 at [address] [place] [address] ([phone]) Shower or bathe using soap and water. Do not use deodorant, powder, perfumes, or lotion the day of your exam.  If your prior mammograms were not performed at [place] [address] please bring films with you or forward prior images 2 days before your procedure. Check in at registration 15min before your appointment time unless you were instructed to do otherwise. A script is not necessary, but if you have one, please bring it on the day of the mammogram or have it faxed to the department. [facility] [phone] [facility]  [phone] [facility] ANANDA  [phone] [facility] [phone] [address] [phone] [facility] SERVICES! Dear Gay Door: Thank you for requesting a MyChart account. Our records indicate that you already have an active [employer]Bot account.   You can access your account anytime at [email or web address] Did you know that you can access your hospital and ER discharge instructions at any time in Buy buy tea? You can also review all of your test results from your hospital stay or ER visit. Additional Information If you have questions, please visit the Frequently Asked Questions section of the Buy buy tea website at [email or web address] Remember, Buy buy tea is NOT to be used for urgent needs. For medical emergencies, dial 911. Now available from your iPhone and Android! Please provide this summary of care documentation to your next provider. Your primary care clinician is listed as Esperanza Fuchs. If you have any questions after today's visit, please call [phone].

## 2018-02-19 NOTE — LETTER
NOTIFICATION RETURN TO WORK  
 
2/19/2018 1:38 PM 
 
Ms. Dahiana Gannon [address] [place] [address] & [place] [id number] To Whom It May Concern: 
 
Dahiana Gannon is currently under the care of CHARLIE Blanton. She will return to work  on: 6/30/18 If there are questions or concerns please have the patient contact our office. Sincerely, Diomedes Hadley MD

## 2018-02-20 LAB
ALBUMIN SERPL-MCNC: 4.5 G/DL (ref 3.5–5.5)
ALBUMIN/GLOB SERPL: 1.4 {RATIO} (ref 1.2–2.2)
ALP SERPL-CCNC: 100 IU/L (ref 39–117)
ALT SERPL-CCNC: 21 IU/L (ref 0–32)
AST SERPL-CCNC: 18 IU/L (ref 0–40)
BILIRUB SERPL-MCNC: 0.4 MG/DL (ref 0–1.2)
BUN SERPL-MCNC: 14 MG/DL (ref 6–24)
BUN/CREAT SERPL: 22 (ref 9–23)
CALCIUM SERPL-MCNC: 9.7 MG/DL (ref 8.7–10.2)
CHLORIDE SERPL-SCNC: 94 MMOL/L (ref 96–106)
CO2 SERPL-SCNC: 20 MMOL/L (ref 18–29)
CREAT SERPL-MCNC: 0.65 MG/DL (ref 0.57–1)
GFR SERPLBLD CREATININE-BSD FMLA CKD-EPI: 114 ML/MIN/1.73
GFR SERPLBLD CREATININE-BSD FMLA CKD-EPI: 99 ML/MIN/1.73
GLOBULIN SER CALC-MCNC: 3.2 G/DL (ref 1.5–4.5)
GLUCOSE SERPL-MCNC: 81 MG/DL (ref 65–99)
POTASSIUM SERPL-SCNC: 4.8 MMOL/L (ref 3.5–5.2)
PROT SERPL-MCNC: 7.7 G/DL (ref 6–8.5)
SODIUM SERPL-SCNC: 147 MMOL/L (ref 134–144)

## 2018-02-22 ENCOUNTER — TELEPHONE (OUTPATIENT)
Dept: FAMILY MEDICINE CLINIC | Age: 58
End: 2018-02-22

## 2018-02-22 NOTE — TELEPHONE ENCOUNTER
Received faxed from [facility] not covered by her [employer] prefers    PA is form sent to [place] for patients [facility] and its pending

## 2018-02-23 NOTE — TELEPHONE ENCOUNTER
Received approval letter from Suha Cortes for patient [facility] and scan letter to chart    Informed [facility] via fax [phone]

## 2018-03-03 LAB
ACID FAST STN SPEC: NEGATIVE
MYCOBACTERIUM SPEC QL CULT: NEGATIVE
SPECIMEN PREPARATION: NORMAL
SPECIMEN SOURCE: NORMAL

## 2018-03-26 ENCOUNTER — TELEPHONE (OUTPATIENT)
Dept: FAMILY MEDICINE CLINIC | Age: 58
End: 2018-03-26

## 2018-03-26 NOTE — TELEPHONE ENCOUNTER
Patient is requesting a letter from the Nurse of Dr Pat Cornejo to be sent (Fax) to the patients work stating that Dr Coy Albino be able to see the patient on April 9th and the patient is next scheduled to see him on April 20 th. [facility]  Phone # [phone], Ext [phone]  Fax # [phone]  Patient is requesting the letter to be sent before April 8th, 2018    Patient best call back # [phone]

## 2018-03-29 ENCOUNTER — TELEPHONE (OUTPATIENT)
Dept: FAMILY MEDICINE CLINIC | Age: 58
End: 2018-03-29

## 2018-03-29 NOTE — TELEPHONE ENCOUNTER
Pt called to let us know that her return to work form was faxed to wrong number, please re fax to [phone]

## 2018-04-20 ENCOUNTER — OFFICE VISIT (OUTPATIENT)
Dept: FAMILY MEDICINE CLINIC | Age: 58
End: 2018-04-20

## 2018-04-20 VITALS
DIASTOLIC BLOOD PRESSURE: 80 MMHG | WEIGHT: 228.4 LBS | HEIGHT: 66 IN | HEART RATE: 79 BPM | TEMPERATURE: 96.7 F | RESPIRATION RATE: 16 BRPM | SYSTOLIC BLOOD PRESSURE: 128 MMHG | OXYGEN SATURATION: 92 % | BODY MASS INDEX: 36.71 KG/M2

## 2018-04-20 DIAGNOSIS — E11.9 CONTROLLED TYPE 2 DIABETES MELLITUS WITHOUT COMPLICATION, WITHOUT LONG-TERM CURRENT USE OF INSULIN (HCC): Primary | ICD-10-CM

## 2018-04-20 DIAGNOSIS — E66.9 OBESITY, CLASS II, BMI 35-39.9: ICD-10-CM

## 2018-04-20 DIAGNOSIS — J44.9 OBSTRUCTIVE CHRONIC BRONCHITIS WITHOUT EXACERBATION (HCC): ICD-10-CM

## 2018-04-20 DIAGNOSIS — F17.200 TOBACCO USE DISORDER: ICD-10-CM

## 2018-04-20 PROBLEM — E66.01 OBESITY, CLASS III, BMI 40-49.9 (MORBID OBESITY) (HCC): Status: RESOLVED | Noted: 2017-12-13 | Resolved: 2018-04-20

## 2018-04-20 PROBLEM — E66.01 SEVERE OBESITY (BMI 35.0-39.9) WITH COMORBIDITY (HCC): Status: ACTIVE | Noted: 2018-04-20

## 2018-04-20 PROBLEM — E11.65 UNCONTROLLED TYPE 2 DIABETES MELLITUS WITH HYPERGLYCEMIA, WITHOUT LONG-TERM CURRENT USE OF INSULIN (HCC): Status: RESOLVED | Noted: 2018-02-19 | Resolved: 2018-04-20

## 2018-04-20 LAB — HBA1C MFR BLD HPLC: 6.3 %

## 2018-04-20 RX ORDER — LEVALBUTEROL INHALATION SOLUTION 1.25 MG/3ML
SOLUTION RESPIRATORY (INHALATION)
Qty: 75 NEBULE | Refills: 11 | Status: SHIPPED | OUTPATIENT
Start: 2018-04-20

## 2018-04-20 RX ORDER — FLUTICASONE FUROATE AND VILANTEROL 200; 25 UG/1; UG/1
1 POWDER RESPIRATORY (INHALATION) DAILY
Qty: 1 INHALER | Refills: 11 | Status: SHIPPED | OUTPATIENT
Start: 2018-04-20

## 2018-04-20 NOTE — PROGRESS NOTES
Casa Valencia is a 62 y.o. female    Chief Complaint   Patient presents with    Diabetes     3 month        1. Have you been to the ER, urgent care clinic since your last visit? Hospitalized since your last visit? No    2. Have you seen or consulted any other health care providers outside of the [facility] since your last visit? Include any pap smears or colon screening.   No    Visit Vitals    /65 (BP 1 Location: Right arm, BP Patient Position: Sitting)    Pulse 79    Temp 96.7 °F (35.9 °C) (Oral)    Resp 16    Ht 5' 6\" (1.676 m)    Wt 228 lb 6.4 oz (103.6 kg)    SpO2 92%    BMI 36.86 kg/m2

## 2018-04-20 NOTE — LETTER
4/20/2018 11:14 AM 
 
Ms. [address] [id number] To Whom It May Concern, Please allow pt to out of work till 7/1/18 Any questions and with the permission of ms. Teresa please let me know. Sincerely, Tomasz Veras MD

## 2018-04-20 NOTE — MR AVS SNAPSHOT
[facility] 
 
 
 [address] 
[phone] Patient: Juna Daniel Mendoza MRN: [id number] :[id number] Visit Information Date & Time Provider Department Dept. Phone Encounter #  
 2018 10:30 AM Joseph Shultz  [facility] [phone] [id number] Follow-up Instructions Return for pt to find new PCP. Upcoming Health Maintenance Date Due  
 EYE EXAM RETINAL OR DILATED Q1 2015 BREAST CANCER SCRN MAMMOGRAM 2018 HEMOGLOBIN A1C Q6M 2018 FOOT EXAM Q1 2019 MICROALBUMIN Q1 2019 LIPID PANEL Q1 2019 PAP AKA CERVICAL CYTOLOGY 2020 DTaP/Tdap/Td series (2 - Td) 2020 COLONOSCOPY 2023 Allergies as of 2018  Review Complete On: 2018 By: Joseph Shultz MD  
  
 Severity Noted Reaction Type Reactions Pcn [Penicillins] High 2010    Anaphylaxis Clindamycin  2010    Itching SWELLING & ITCHING IN VAGINAL AREA. Current Immunizations  Reviewed on 10/2/2017 Name Date Influenza Vaccine (Quad) PF 10/2/2017, 10/2/2015 Influenza Vaccine Split 2010 Pneumococcal Conjugate (PCV-13) 10/2/2017 Pneumococcal Vaccine (Unspecified Type) 2009 TDAP Vaccine 2010 Not reviewed this visit You Were Diagnosed With   
  
 Codes Comments Controlled type 2 diabetes mellitus without complication, without long-term current use of insulin ([facility]    -  Primary ICD-10-CM: E11.9 ICD-9-CM: 250.00 Obesity, Class II, BMI 35-39.9     ICD-10-CM: E66.9 ICD-9-CM: 278.00 Tobacco use disorder     ICD-10-CM: F17.200 ICD-9-CM: 305.1 Obstructive chronic bronchitis without exacerbation ([facility] [address]     ICD-10-CM: J44.9 ICD-9-CM: 491.20 Vitals BP Pulse Temp Resp Height(growth percentile) Weight(growth percentile) 128/80 79 96.7 °F (35.9 °C) (Oral) 16 5' 6\" (1.676 m) 228 lb 6.4 oz (103.6 kg) SpO2 BMI OB Status Smoking Status 92% 36.86 kg/m2 Postmenopausal Current Some Day Smoker Vitals History BMI and BSA Data Body Mass Index Body Surface Area  
 36.86 kg/m 2 2.2 m 2 Preferred Pharmacy Pharm[facility] Name Phone [facility] [address] [facility] [address] [address] [phone] Your Updated Medication List  
  
   
This list is accurate as of 4/20/18 11:20 AM.  Always use your most recent med list.  
  
  
  
  
 albuterol 90 mcg/actuation inhaler Commonly known as:  PROAIR HFA Take 2 Puffs by inhalation every six (6) hours as needed for Wheezing. dulaglutide 1.5 mg/0.5 mL sub-q pen Commonly known as:  TRULICITY  
0.5 mL by SubCUTAneous route every seven (7) days. fluticasone-vilanterol 200-25 mcg/dose inhaler Commonly known as:  BREO ELLIPTA Take 1 Puff by inhalation daily. furosemide 20 mg tablet Commonly known as:  LASIX Take 1 Tab by mouth daily. levalbuterol 1.25 mg/3 mL Nebu Commonly known as:  XOPENEX  
USE ONE VIAL IN NEBULIZER THREE TIMES DAILY AS NEEDED  
  
 lidocaine HCl 3 % topical cream  
Commonly known as:  XYLOCAINE Apply  to affected area two (2) times a day. losartan 25 mg tablet Commonly known as:  COZAAR Take 1 Tab by mouth daily. For blood pressure  
  
 mometasone-formoterol 200-5 mcg/actuation HFA inhaler Commonly known as:  Elex Lopez Take 2 Puffs by inhalation two (2) times a day. Nebulizer & Compressor machine Machine and supplies for as needed nebulizer treatments. Dx: I27.7 SITagliptin-metFORMIN  mg per tablet Commonly known as:  Posey Chriss Take 1 Tab by mouth two (2) times daily (with meals). For diabetes  
  
 umeclidinium 62.5 mcg/actuation inhaler Commonly known as:  INCRUSE ELLIPTA Take 1 Puff by inhalation daily. Indications: PREVENTION OF BRONCHOSPASMS WITH EMPHYSEMA Prescriptions Sent to Pharmacy Refills fluticasone-vilanterol (BREO ELLIPTA) 200-25 mcg/dose inhaler 11 Sig: Take 1 Puff by inhalation daily. Class: Normal  
 Pharmacy: [facility] DR MECHE JEAN BAPTISTE [address] [place] [address],[address], [address] [facility] Ph #: [phone] Route: Inhalation  
 dulaglutide (TRULICITY) 1.5 UQ/4.3 mL sub-q pen 11 Si.5 mL by SubCUTAneous route every seven (7) days. Class: Normal  
 Pharmacy: [facility] DR MECHE JEAN BAPTISTE [address] [place] [address],[address], [address] [facility] Ph #: [phone] Route: SubCUTAneous  
 levalbuterol (XOPENEX) 1.25 mg/3 mL nebu 11 Sig: USE ONE VIAL IN NEBULIZER THREE TIMES DAILY AS NEEDED Class: Normal  
 Pharmacy: [facility] DR MECHE JEAN BAPTISTE [address] [place] [address],[address], [address] [facility] Ph #: [phone] We Performed the Following AMB POC HEMOGLOBIN A1C [27092 CPT(R)] Follow-up Instructions Return for pt to find new PCP. To-Do List   
 2018 1:00 PM  
  Appointment with [facility] 1 at [facility] at [address] [place] [address] ([phone]) NON-CONTRAST STUDY: 1. Bring any non [facility] facility films/images pertaining to the area of interest with you on the day of appointment. 2. Check in at registration at least 30 minutes before appt time unless you were instructed to do otherwise. 3. If you have to drink oral contrast please pick it up any weekday prior to your appointment, if you cannot please check in 2 hrs  before appt time. 2018 2:30 PM  
  Appointment with [facility] [address] at [address] [place] [address] ([phone]) Shower or bathe using soap and water. Do not use deodorant, powder, perfumes, or lotion the day of your exam.  If your prior mammograms were not performed at [facility] please bring films with you or forward prior images 2 days before your procedure. Check in at registration 15min before your appointment time unless you were instructed to do otherwise.   A script is not necessary, but if you have one, please bring it on the day of the mammogram or have it faxed to the department. [facility] [phone] [facility]  [phone] [facility]  [phone] [facility] [phone] [place] [address] [facility] Lala Fernandez [phone] Patient Instructions Asthma Attack: Care Instructions Your Care Instructions During an asthma attack, the airways swell and narrow. This makes it hard to breathe. Severe asthma attacks can be life-threatening, but you can help prevent them by keeping your asthma under control and treating symptoms before they get bad. Symptoms include being short of breath, having chest tightness, coughing, and wheezing. Noting and treating these symptoms can also help you avoid future trips to the emergency room. The doctor has checked you carefully, but problems can develop later. If you notice any problems or new symptoms, get medical treatment right away. Follow-up care is a key part of your treatment and safety. Be sure to make and go to all appointments, and call your doctor if you are having problems. It's also a good idea to know your test results and keep a list of the medicines you take. How can you care for yourself at home? · Follow your asthma action plan to prevent and treat attacks. If you don't have an asthma action plan, work with your doctor to create one. · Take your asthma medicines exactly as prescribed. Talk to your doctor right away if you have any questions about how to take them. ¨ Use your quick-relief medicine when you have symptoms of an attack. Quick-relief medicine is usually an albuterol inhaler. Some people need to use quick-relief medicine before they exercise. ¨ Take your controller medicine every day, not just when you have symptoms. Controller medicine is usually an inhaled corticosteroid. The goal is to prevent problems before they occur. Don't use your controller medicine to treat an attack that has already started. It doesn't work fast enough to help. ¨ If your doctor prescribed corticosteroid pills to use during an attack, take them exactly as prescribed. It may take hours for the pills to work, but they may make the episode shorter and help you breathe better. ¨ Keep your quick-relief medicine with you at all times. · Talk to your doctor before using other medicines. Some medicines, such as aspirin, can cause asthma attacks in some people. · If you have a peak flow meter, use it to check how well you are breathing. This can help you predict when an asthma attack is going to occur. Then you can take medicine to prevent the asthma attack or make it less severe. · Do not smoke or allow others to smoke around you. Avoid smoky places. Smoking makes asthma worse. If you need help quitting, talk to your doctor about stop-smoking programs and medicines. These can increase your chances of quitting for good. · Learn what triggers an asthma attack for you, and avoid the triggers when you can. Common triggers include colds, smoke, air pollution, dust, pollen, mold, pets, cockroaches, stress, and cold air. · Avoid colds and the flu. Get a pneumococcal vaccine shot. If you have had one before, ask your doctor if you need a second dose. Get a flu vaccine every fall. If you must be around people with colds or the flu, wash your hands often. When should you call for help? Call 911 anytime you think you may need emergency care. For example, call if: 
? · You have severe trouble breathing. ?Call your doctor now or seek immediate medical care if: 
? · Your symptoms do not get better after you have followed your asthma action plan. ? · You have new or worse trouble breathing. ? · Your coughing and wheezing get worse. ? · You cough up dark brown or bloody mucus (sputum). ? · You have a new or higher fever. ? Watch closely for changes in your health, and be sure to contact your doctor if: 
? · You need to use quick-relief medicine on more than 2 days a week (unless it is just for exercise). ? · You cough more deeply or more often, especially if you notice more mucus or a change in the color of your mucus. ? · You are not getting better as expected. Where can you learn more? Go to http://jennie-ross.info/. Enter A389 in the search box to learn more about \"Asthma Attack: Care Instructions. \" Current as of: May 12, 2017 Content Version: 11.4 © 3681-6894 [employer]. Care instructions adapted under license by [employer] (which disclaims liability or warranty for this information). If you have questions about a medical condition or this instruction, always ask your healthcare professional. [address] any warranty or liability for your use of this information. Introducing [facility]! Dear Jese Saleh: Thank you for requesting a [employer] account. Our records indicate that you already have an active [employer] account. You can access your account anytime at [email or web address] Did you know that you can access your hospital and ER discharge instructions at any time in [employer]? You can also review all of your test results from your hospital stay or ER visit. Additional Information If you have questions, please visit the Frequently Asked Questions section of the [employer] website at [email or web address] Remember, [employer] is NOT to be used for urgent needs. For medical emergencies, dial 911. Now available from your iPhone and Android! Please provide this summary of care documentation to your next provider. Your primary care clinician is listed as Vaughn Elfego. If you have any questions after today's visit, please call [phone].

## 2018-04-20 NOTE — PROGRESS NOTES
HISTORY OF PRESENT ILLNESS  Valery Cheatham is a 62 y.o. female. Blood pressure 128/80, pulse 79, temperature 96.7 °F (35.9 °C), temperature source Oral, resp. rate 16, height 5' 6\" (1.676 m), weight 228 lb 6.4 oz (103.6 kg), SpO2 92 %. Body mass index is 36.86 kg/(m^2). Chief Complaint   Patient presents with    Diabetes     3 month         HPI  Poonam Teresa 62 y.o. female  presents to the office today for diabetes follow up. Hypertension: Bp at office today 155/65, 128/80 by manual arm cuff recheck. Pt continues with Losartan 25mg daily. Pt states that at her appointment this morning her Bp was in the 130/80s. DM2: A1c per POC today 6.3%, lowered from A1c of 9.5% on 01/08/18. Pt continues with Trulicity 2.9PL every 7 days and Janumet 50-500mg BID. Pt states that she has been taking her medications regularly and has cut sugars out of her diet. Advised pt to continue to work on her diet and exercise as well as taking her medication regularly. Health maintenance: Pt states that she has tried to discontinue smoking, but has smoked several times during her move due to stress. She notes that she had her eye exam this morning that was unremarkable. Advised pt that she is due for a mammogram. She notes that she is scheduled for a mammogram on 05/09/18. Discussed with pt that she needs to try to work on her weight. Current Outpatient Prescriptions   Medication Sig Dispense Refill    fluticasone-vilanterol (BREO ELLIPTA) 200-25 mcg/dose inhaler Take 1 Puff by inhalation daily. 1 Inhaler 11    dulaglutide (TRULICITY) 1.5 FT/1.8 mL sub-q pen 0.5 mL by SubCUTAneous route every seven (7) days. 4 Pen 11    levalbuterol (XOPENEX) 1.25 mg/3 mL nebu USE ONE VIAL IN NEBULIZER THREE TIMES DAILY AS NEEDED 75 Nebule 11    albuterol (PROAIR HFA) 90 mcg/actuation inhaler Take 2 Puffs by inhalation every six (6) hours as needed for Wheezing.  1 Inhaler 5    SITagliptin-metFORMIN (JANUMET)  mg per tablet Take 1 Tab by mouth two (2) times daily (with meals). For diabetes 60 Tab 5    losartan (COZAAR) 25 mg tablet Take 1 Tab by mouth daily. For blood pressure 90 Tab 1    mometasone-formoterol (DULERA) 200-5 mcg/actuation HFA inhaler Take 2 Puffs by inhalation two (2) times a day. 1 Inhaler 5    lidocaine HCl (XYLOCAINE) 3 % topical cream Apply  to affected area two (2) times a day. 85 g 1    umeclidinium (INCRUSE ELLIPTA) 62.5 mcg/actuation inhaler Take 1 Puff by inhalation daily. Indications: PREVENTION OF BRONCHOSPASMS WITH EMPHYSEMA (Patient taking differently: Take 1 Puff by inhalation daily. Takes qod  Indications: PREVENTION OF BRONCHOSPASMS WITH EMPHYSEMA) 1 Inhaler 5    furosemide (LASIX) 20 mg tablet Take 1 Tab by mouth daily. (Patient taking differently: Take 20 mg by mouth as needed.) 30 Tab 5    Nebulizer & Compressor machine Machine and supplies for as needed nebulizer treatments. Dx: J44.9 1 Each 0     Allergies   Allergen Reactions    Pcn [Penicillins] Anaphylaxis    Clindamycin Itching     SWELLING & ITCHING IN VAGINAL AREA. Past Medical History:   Diagnosis Date    Anxiety     Arthralgia     Asthma     Asthma     Chronic obstructive pulmonary disease ([facility] [address]     PFT 8/2015    Crack cocaine use     quit; used for 8 years    Diabetes ([facility]     Hemorrhoid     Psychiatric disorder     ANXIETY.  Sleep apnea, obstructive 2016    Tobacco use      Past Surgical History:   Procedure Laterality Date    ENDOSCOPY, COLON, DIAGNOSTIC  2008    F/U 5 years Dr. Rafal Go HX GI      COLONOSCOPY X 1    HX HEENT      WISDOM TEETH EXTRACTION.      Family History   Problem Relation Age of Onset    Cancer Mother      colon    Diabetes Father     Hypertension Father      Social History   Substance Use Topics    Smoking status: Current Some Day Smoker     Packs/day: 0.25     Years: 22.00     Types: Cigarettes    Smokeless tobacco: Never Used      Comment: smokes about 2-3  cigarettes/day    Alcohol use No        Review of Systems   Constitutional: Negative. Negative for malaise/fatigue. Eyes: Negative for blurred vision. Respiratory: Negative for shortness of breath. Cardiovascular: Negative for chest pain and leg swelling. Musculoskeletal: Negative. Neurological: Negative. Negative for dizziness and headaches. All other systems reviewed and are negative. Physical Exam   Constitutional: She is oriented to person, place, and time. She appears well-developed and well-nourished. No distress. HENT:   Head: Normocephalic and atraumatic. Neck: Carotid bruit is not present. Cardiovascular: Normal rate, regular rhythm, normal heart sounds and intact distal pulses. Exam reveals no gallop and no friction rub. No murmur heard. Pulmonary/Chest: Effort normal. No respiratory distress. She has wheezes (scattered bilaterally). She has no rales. Musculoskeletal: She exhibits no edema. Neurological: She is alert and oriented to person, place, and time. Skin: She is not diaphoretic. Psychiatric: She has a normal mood and affect. Her behavior is normal. Judgment and thought content normal.   Nursing note and vitals reviewed. ASSESSMENT and PLAN  Diagnoses and all orders for this visit:    1. Controlled type 2 diabetes mellitus without complication, without long-term current use of insulin ([facility])  -     AMB POC HEMOGLOBIN A1C  -     dulaglutide (TRULICITY) 1.5 JN/8.5 mL sub-q pen; 0.5 mL by SubCUTAneous route every seven (7) days.  - A1c per POC today 6.3%, lowered from A1c of 9.5% on 01/08/18. Pt continues with Trulicity 4.7XN every 7 days and Janumet 50-500mg BID. Advised pt to continue to work on her diet and exercise as well as taking her medication regularly. 2. Obesity, Class II, BMI 35-39.9        - I have reviewed/discussed the above normal BMI with the patient.   I have recommended the following interventions: dietary management education, guidance, and counseling, encourage exercise and monitor weight . 3. Tobacco use disorder        - Advised pt to discontinue smoking.         - The patient was counseled on the dangers of tobacco use, and was advised to quit. Reviewed strategies to maximize success, including removing cigarettes and smoking materials from environment. 4. Asthma COPD overlap syndrome(HCC)  -     fluticasone-vilanterol (BREO ELLIPTA) 200-25 mcg/dose inhaler; Take 1 Puff by inhalation daily. -     levalbuterol (XOPENEX) 1.25 mg/3 mL nebu; USE ONE VIAL IN NEBULIZER THREE TIMES DAILY AS NEEDED  - Pt is followed by Dr. Janine Celestin (Pulmonology). Follow-up Disposition:  Return for pt to find new PCP. Medication risks/benefits/costs/interactions/alternatives discussed with patient. Advised patient to call back or return to office if symptoms worsen/change/persist.  If patient cannot reach us or should anything more severe/urgent arise he/she should proceed directly to the nearest emergency department. Discussed expected course/resolution/complications of diagnosis in detail with patient. Patient given a written after visit summary which includes her diagnoses, current medications and vitals. Patient expressed understanding with the diagnosis and plan. Written by susu Sierra, as dictated by Evgeny Segundo M.D.   I have reviewed and agree with the above note and have made corrections where appropriate, Dr. Nathan Asher MD

## 2018-04-20 NOTE — PATIENT INSTRUCTIONS
Asthma Attack: Care Instructions  Your Care Instructions    During an asthma attack, the airways swell and narrow. This makes it hard to breathe. Severe asthma attacks can be life-threatening, but you can help prevent them by keeping your asthma under control and treating symptoms before they get bad. Symptoms include being short of breath, having chest tightness, coughing, and wheezing. Noting and treating these symptoms can also help you avoid future trips to the emergency room. The doctor has checked you carefully, but problems can develop later. If you notice any problems or new symptoms, get medical treatment right away. Follow-up care is a key part of your treatment and safety. Be sure to make and go to all appointments, and call your doctor if you are having problems. It's also a good idea to know your test results and keep a list of the medicines you take. How can you care for yourself at home? · Follow your asthma action plan to prevent and treat attacks. If you don't have an asthma action plan, work with your doctor to create one. · Take your asthma medicines exactly as prescribed. Talk to your doctor right away if you have any questions about how to take them. ¨ Use your quick-relief medicine when you have symptoms of an attack. Quick-relief medicine is usually an albuterol inhaler. Some people need to use quick-relief medicine before they exercise. ¨ Take your controller medicine every day, not just when you have symptoms. Controller medicine is usually an inhaled corticosteroid. The goal is to prevent problems before they occur. Don't use your controller medicine to treat an attack that has already started. It doesn't work fast enough to help. ¨ If your doctor prescribed corticosteroid pills to use during an attack, take them exactly as prescribed. It may take hours for the pills to work, but they may make the episode shorter and help you breathe better.   ¨ Keep your quick-relief medicine with you at all times. · Talk to your doctor before using other medicines. Some medicines, such as aspirin, can cause asthma attacks in some people. · If you have a peak flow meter, use it to check how well you are breathing. This can help you predict when an asthma attack is going to occur. Then you can take medicine to prevent the asthma attack or make it less severe. · Do not smoke or allow others to smoke around you. Avoid smoky places. Smoking makes asthma worse. If you need help quitting, talk to your doctor about stop-smoking programs and medicines. These can increase your chances of quitting for good. · Learn what triggers an asthma attack for you, and avoid the triggers when you can. Common triggers include colds, smoke, air pollution, dust, pollen, mold, pets, cockroaches, stress, and cold air. · Avoid colds and the flu. Get a pneumococcal vaccine shot. If you have had one before, ask your doctor if you need a second dose. Get a flu vaccine every fall. If you must be around people with colds or the flu, wash your hands often. When should you call for help? Call 911 anytime you think you may need emergency care. For example, call if:  ? · You have severe trouble breathing. ?Call your doctor now or seek immediate medical care if:  ? · Your symptoms do not get better after you have followed your asthma action plan. ? · You have new or worse trouble breathing. ? · Your coughing and wheezing get worse. ? · You cough up dark brown or bloody mucus (sputum). ? · You have a new or higher fever. ? Watch closely for changes in your health, and be sure to contact your doctor if:  ? · You need to use quick-relief medicine on more than 2 days a week (unless it is just for exercise). ? · You cough more deeply or more often, especially if you notice more mucus or a change in the color of your mucus. ? · You are not getting better as expected. Where can you learn more?   Go to http://jennie-ross.info/. Enter O856 in the search box to learn more about \"Asthma Attack: Care Instructions. \"  Current as of: May 12, 2017  Content Version: 11.4  © 5677-8481 Healthwise, [employer]. Care instructions adapted under license by [employer] (which disclaims liability or warranty for this information). If you have questions about a medical condition or this instruction, always ask your healthcare professional. [place] [address] any warranty or liability for your use of this information.

## 2018-04-20 NOTE — PROGRESS NOTES
Recent Results (from the past 12 hour(s))   AMB POC HEMOGLOBIN A1C    Collection Time: 04/20/18 10:39 AM   Result Value Ref Range    Hemoglobin A1c (POC) 6.3 %

## 2018-04-20 NOTE — LETTER
4/20/2018 11:29 AM 
 
Ms. Huggins [address] [facility] [phone] To whom it may concern: 
 
 
Patient Tierra Jessica 1960 was seen in our office today and per patient she/he was seen for eye examination at your office by Enedelia Kauffman can we have his/her results fax to our office so we can update his/her chart [phone]. If you have any question please don't hesitate to call us back. Sincerely, Shavonne Bullock MD

## 2018-06-20 ENCOUNTER — OFFICE VISIT (OUTPATIENT)
Dept: FAMILY MEDICINE CLINIC | Age: 58
End: 2018-06-20

## 2018-06-20 VITALS
HEART RATE: 86 BPM | SYSTOLIC BLOOD PRESSURE: 134 MMHG | TEMPERATURE: 97.8 F | BODY MASS INDEX: 36.32 KG/M2 | OXYGEN SATURATION: 90 % | DIASTOLIC BLOOD PRESSURE: 72 MMHG | HEIGHT: 66 IN | WEIGHT: 226 LBS | RESPIRATION RATE: 18 BRPM

## 2018-06-20 DIAGNOSIS — F17.200 TOBACCO USE DISORDER: ICD-10-CM

## 2018-06-20 DIAGNOSIS — E78.6 LOW HDL (UNDER 40): ICD-10-CM

## 2018-06-20 DIAGNOSIS — Z12.39 SCREENING FOR MALIGNANT NEOPLASM OF BREAST: ICD-10-CM

## 2018-06-20 DIAGNOSIS — E66.01 SEVERE OBESITY (BMI 35.0-39.9) WITH COMORBIDITY (HCC): ICD-10-CM

## 2018-06-20 DIAGNOSIS — E11.9 CONTROLLED TYPE 2 DIABETES MELLITUS WITHOUT COMPLICATION, WITHOUT LONG-TERM CURRENT USE OF INSULIN (HCC): Primary | ICD-10-CM

## 2018-06-20 NOTE — PATIENT INSTRUCTIONS
Body Mass Index: Care Instructions  Your Care Instructions    Body mass index (BMI) can help you see if your weight is raising your risk for health problems. It uses a formula to compare how much you weigh with how tall you are. · A BMI lower than 18.5 is considered underweight. · A BMI between 18.5 and 24.9 is considered healthy. · A BMI between 25 and 29.9 is considered overweight. A BMI of 30 or higher is considered obese. If your BMI is in the normal range, it means that you have a lower risk for weight-related health problems. If your BMI is in the overweight or obese range, you may be at increased risk for weight-related health problems, such as high blood pressure, heart disease, stroke, arthritis or joint pain, and diabetes. If your BMI is in the underweight range, you may be at increased risk for health problems such as fatigue, lower protection (immunity) against illness, muscle loss, bone loss, hair loss, and hormone problems. BMI is just one measure of your risk for weight-related health problems. You may be at higher risk for health problems if you are not active, you eat an unhealthy diet, or you drink too much alcohol or use tobacco products. Follow-up care is a key part of your treatment and safety. Be sure to make and go to all appointments, and call your doctor if you are having problems. It's also a good idea to know your test results and keep a list of the medicines you take. How can you care for yourself at home? · Practice healthy eating habits. This includes eating plenty of fruits, vegetables, whole grains, lean protein, and low-fat dairy. · If your doctor recommends it, get more exercise. Walking is a good choice. Bit by bit, increase the amount you walk every day. Try for at least 30 minutes on most days of the week. · Do not smoke. Smoking can increase your risk for health problems. If you need help quitting, talk to your doctor about stop-smoking programs and medicines. These can increase your chances of quitting for good. · Limit alcohol to 2 drinks a day for men and 1 drink a day for women. Too much alcohol can cause health problems. If you have a BMI higher than 25  · Your doctor may do other tests to check your risk for weight-related health problems. This may include measuring the distance around your waist. A waist measurement of more than 40 inches in men or 35 inches in women can increase the risk of weight-related health problems. · Talk with your doctor about steps you can take to stay healthy or improve your health. You may need to make lifestyle changes to lose weight and stay healthy, such as changing your diet and getting regular exercise. If you have a BMI lower than 18.5  · Your doctor may do other tests to check your risk for health problems. · Talk with your doctor about steps you can take to stay healthy or improve your health. You may need to make lifestyle changes to gain or maintain weight and stay healthy, such as getting more healthy foods in your diet and doing exercises to build muscle. Where can you learn more? Go to http://jennie-ross.info/. Enter S176 in the search box to learn more about \"Body Mass Index: Care Instructions. \"  Current as of: October 13, 2016  Content Version: 11.4  © 3511-8974 Healthwise, Incorporated. Care instructions adapted under license by [employer] (which disclaims liability or warranty for this information). If you have questions about a medical condition or this instruction, always ask your healthcare professional. [address] any warranty or liability for your use of this information.

## 2018-06-20 NOTE — MR AVS SNAPSHOT
[facility] 
 
 
 [address] 
[phone] Patient: Kasi Eduardo MRN: [id number] :1960 Visit Information Date & Time Provider Department Dept. Phone Encounter #  
 2018 11:45 AM Kari Hutson  [facility] [address] [phone] [id number] Follow-up Instructions Return if symptoms worsen or fail to improve, for COPD. Upcoming Health Maintenance Date Due  
 BREAST CANCER SCRN MAMMOGRAM 2018 Influenza Age 5 to Adult 2018 HEMOGLOBIN A1C Q6M 10/20/2018 FOOT EXAM Q1 2019 MICROALBUMIN Q1 2019 LIPID PANEL Q1 2019 EYE EXAM RETINAL OR DILATED Q1 2019 PAP AKA CERVICAL CYTOLOGY 2020 DTaP/Tdap/Td series (2 - Td) 2020 COLONOSCOPY 2023 Allergies as of 2018  Review Complete On: 2018 By: Kari Hutson MD  
  
 Severity Noted Reaction Type Reactions Pcn [Penicillins] High 2010    Anaphylaxis Clindamycin  2010    Itching SWELLING & ITCHING IN VAGINAL AREA. Current Immunizations  Reviewed on 10/2/2017 Name Date Influenza Vaccine (Quad) PF 10/2/2017, 10/2/2015 Influenza Vaccine Split 2010 Pneumococcal Conjugate (PCV-13) 10/2/2017 Pneumococcal Vaccine (Unspecified Type) 2009 TDAP Vaccine 2010 Not reviewed this visit You Were Diagnosed With   
  
 Codes Comments Controlled type 2 diabetes mellitus without complication, without long-term current use of insulin ([facility]    -  Primary ICD-10-CM: E11.9 ICD-9-CM: 250.00 Screening for malignant neoplasm of breast     ICD-10-CM: Z12.31 
ICD-9-CM: V76.10 Severe obesity (BMI 35.0-39. 9) with comorbidity ([facility]     ICD-10-CM: E66.01 
ICD-9-CM: 278.01 Tobacco use disorder     ICD-10-CM: F17.200 ICD-9-CM: 305.1 Low HDL (under 40)     ICD-10-CM: E78.6 ICD-9-CM: 272.5 Vitals BP Pulse Temp Resp Height(growth percentile) Weight(growth percentile) 134/72 (BP 1 Location: Left arm, BP Patient Position: Sitting) 86 97.8 °F (36.6 °C) (Oral) 18 5' 6\" (1.676 m) 226 lb (102.5 kg) SpO2 BMI OB Status Smoking Status 90% 36.48 kg/m2 Postmenopausal Current Some Day Smoker BMI and BSA Data Body Mass Index Body Surface Area  
 36.48 kg/m 2 2.18 m 2 Preferred Pharmacy Pharmacy Name Phone [address] [address] [phone] Your Updated Medication List  
  
   
This list is accurate as of 6/20/18 12:58 PM.  Always use your most recent med list.  
  
  
  
  
 albuterol 90 mcg/actuation inhaler Commonly known as:  PROAIR HFA Take 2 Puffs by inhalation every six (6) hours as needed for Wheezing. dulaglutide 1.5 mg/0.5 mL sub-q pen Commonly known as:  TRULICITY  
0.5 mL by SubCUTAneous route every seven (7) days. fluticasone-vilanterol 200-25 mcg/dose inhaler Commonly known as:  BREO ELLIPTA Take 1 Puff by inhalation daily. furosemide 20 mg tablet Commonly known as:  LASIX Take 1 Tab by mouth daily. levalbuterol 1.25 mg/3 mL Nebu Commonly known as:  XOPENEX  
USE ONE VIAL IN NEBULIZER THREE TIMES DAILY AS NEEDED  
  
 lidocaine HCl 3 % topical cream  
Commonly known as:  XYLOCAINE Apply  to affected area two (2) times a day. losartan 25 mg tablet Commonly known as:  COZAAR Take 1 Tab by mouth daily. For blood pressure  
  
 mometasone-formoterol 200-5 mcg/actuation HFA inhaler Commonly known as:  S[place]ana  Take 2 Puffs by inhalation two (2) times a day. Nebulizer & Compressor machine Machine and supplies for as needed nebulizer treatments. Dx: X12.9 SITagliptin-metFORMIN  mg per tablet Commonly known as:  Shawna Brow Take 1 Tab by mouth two (2) times daily (with meals). For diabetes  
  
 umeclidinium 62.5 mcg/actuation inhaler Commonly known as:  INCRUSE ELLIPTA Take 1 Puff by inhalation daily. Indications: PREVENTION OF BRONCHOSPASMS WITH EMPHYSEMA Follow-up Instructions Return if symptoms worsen or fail to improve, for COPD. To-Do List   
 06/20/2018 Imaging:  FRANSISCO MAMMO BI SCREENING INCL CAD Patient Instructions Body Mass Index: Care Instructions Your Care Instructions Body mass index (BMI) can help you see if your weight is raising your risk for health problems. It uses a formula to compare how much you weigh with how tall you are. · A BMI lower than 18.5 is considered underweight. · A BMI between 18.5 and 24.9 is considered healthy. · A BMI between 25 and 29.9 is considered overweight. A BMI of 30 or higher is considered obese. If your BMI is in the normal range, it means that you have a lower risk for weight-related health problems. If your BMI is in the overweight or obese range, you may be at increased risk for weight-related health problems, such as high blood pressure, heart disease, stroke, arthritis or joint pain, and diabetes. If your BMI is in the underweight range, you may be at increased risk for health problems such as fatigue, lower protection (immunity) against illness, muscle loss, bone loss, hair loss, and hormone problems. BMI is just one measure of your risk for weight-related health problems. You may be at higher risk for health problems if you are not active, you eat an unhealthy diet, or you drink too much alcohol or use tobacco products. Follow-up care is a key part of your treatment and safety. Be sure to make and go to all appointments, and call your doctor if you are having problems. It's also a good idea to know your test results and keep a list of the medicines you take. How can you care for yourself at home? · Practice healthy eating habits. This includes eating plenty of fruits, vegetables, whole grains, lean protein, and low-fat dairy. · If your doctor recommends it, get more exercise. Walking is a good choice. Bit by bit, increase the amount you walk every day. Try for at least 30 minutes on most days of the week. · Do not smoke. Smoking can increase your risk for health problems. If you need help quitting, talk to your doctor about stop-smoking programs and medicines. These can increase your chances of quitting for good. · Limit alcohol to 2 drinks a day for men and 1 drink a day for women. Too much alcohol can cause health problems. If you have a BMI higher than 25 · Your doctor may do other tests to check your risk for weight-related health problems. This may include measuring the distance around your waist. A waist measurement of more than 40 inches in men or 35 inches in women can increase the risk of weight-related health problems. · Talk with your doctor about steps you can take to stay healthy or improve your health. You may need to make lifestyle changes to lose weight and stay healthy, such as changing your diet and getting regular exercise. If you have a BMI lower than 18.5 · Your doctor may do other tests to check your risk for health problems. · Talk with your doctor about steps you can take to stay healthy or improve your health. You may need to make lifestyle changes to gain or maintain weight and stay healthy, such as getting more healthy foods in your diet and doing exercises to build muscle. Where can you learn more? Go to http://jennie-ross.info/. Enter S176 in the search box to learn more about \"Body Mass Index: Care Instructions. \" Current as of: October 13, 2016 Content Version: 11.4 © 6750-8510 [employer]. Care instructions adapted under license by [employer] (which disclaims liability or warranty for this information).  If you have questions about a medical condition or this instruction, always ask your healthcare professional. [employer] disclaims any warranty or liability for your use of this information. Introducing [facility]! Dear Hung Sanders: Thank you for requesting a Process Relations account. Our records indicate that you already have an active Process Relations account. You can access your account anytime at [email or web address] Did you know that you can access your hospital and ER discharge instructions at any time in Process Relations? You can also review all of your test results from your hospital stay or ER visit. Additional Information If you have questions, please visit the Frequently Asked Questions section of the Process Relations website at [email or web address] Remember, Process Relations is NOT to be used for urgent needs. For medical emergencies, dial 911. Now available from your iPhone and Android! Please provide this summary of care documentation to your next provider. Your primary care clinician is listed as Denny Wilkins. If you have any questions after today's visit, please call [phone].

## 2018-06-20 NOTE — PROGRESS NOTES
Chief Complaint   Patient presents with    Follow-up    COPD     1. Have you been to the ER, urgent care clinic since your last visit? Hospitalized since your last visit? No    2. Have you seen or consulted any other health care providers outside of the [address] [place] [address] since your last visit? Include any pap smears or colon screening.  Yes Dr. Haritha Wong in may

## 2018-06-20 NOTE — PROGRESS NOTES
HISTORY OF PRESENT ILLNESS  Ceil Son Sushila Villarreal is a 62 y.o. female. Blood pressure 134/72, pulse 86, temperature 97.8 °F (36.6 °C), temperature source Oral, resp. rate 18, height 5' 6\" (1.676 m), weight 226 lb (102.5 kg), SpO2 90 %. Body mass index is 36.48 kg/(m^2). Chief Complaint   Patient presents with    Follow-up    COPD        HPI  Poonam AQUINO Sushila Villarreal 62 y.o. female  presents to the office today for follow up on chronic conditions. Hypertension: Bp at office today 134/72. Pt continues with Losartan 25mg daily. Bp control stable, continue current regimen. DM2: A1c was 6.3% on 04/20/18, lowered from A1c of 9.5% on 01/08/18. Pt continues with Trulicity 3.4LD 5.5HM weekly and Janumet 50-500mg BID. Advised pt to continue to work on diet and exercise. Health maintenance: Advised pt that she is due for a mammogram. Pt states that she was waiting for a free mammogram at a hospital by her. She reports that she is still smoking, but will be staying with a friend who quit and plans to try to quit then. Pt notes that she is still waiting to hear back about receiving disability. Discussed with pt that she is going to need a functional capacity evaluation to fill out the forms. Pt reports that she removed a tick from her back 1 month ago that had been there for 1 day. She currently has a red spot. She denies any aches, pains, or erythematous ring. Current Outpatient Prescriptions   Medication Sig Dispense Refill    fluticasone-vilanterol (BREO ELLIPTA) 200-25 mcg/dose inhaler Take 1 Puff by inhalation daily. 1 Inhaler 11    dulaglutide (TRULICITY) 1.5 DP/8.3 mL sub-q pen 0.5 mL by SubCUTAneous route every seven (7) days. 4 Pen 11    levalbuterol (XOPENEX) 1.25 mg/3 mL nebu USE ONE VIAL IN NEBULIZER THREE TIMES DAILY AS NEEDED 75 Nebule 11    albuterol (PROAIR HFA) 90 mcg/actuation inhaler Take 2 Puffs by inhalation every six (6) hours as needed for Wheezing.  1 Inhaler 5    SITagliptin-metFORMIN (JANUMET)  mg per tablet Take 1 Tab by mouth two (2) times daily (with meals). For diabetes 60 Tab 5    losartan (COZAAR) 25 mg tablet Take 1 Tab by mouth daily. For blood pressure 90 Tab 1    lidocaine HCl (XYLOCAINE) 3 % topical cream Apply  to affected area two (2) times a day. 85 g 1    umeclidinium (INCRUSE ELLIPTA) 62.5 mcg/actuation inhaler Take 1 Puff by inhalation daily. Indications: PREVENTION OF BRONCHOSPASMS WITH EMPHYSEMA (Patient taking differently: Take 1 Puff by inhalation daily. Takes qod  Indications: PREVENTION OF BRONCHOSPASMS WITH EMPHYSEMA) 1 Inhaler 5    furosemide (LASIX) 20 mg tablet Take 1 Tab by mouth daily. (Patient taking differently: Take 20 mg by mouth as needed.) 30 Tab 5    Nebulizer & Compressor machine Machine and supplies for as needed nebulizer treatments. Dx: J44.9 1 Each 0    mometasone-formoterol (DULERA) 200-5 mcg/actuation HFA inhaler Take 2 Puffs by inhalation two (2) times a day. 1 Inhaler 5     Allergies   Allergen Reactions    Pcn [Penicillins] Anaphylaxis    Clindamycin Itching     SWELLING & ITCHING IN VAGINAL AREA. Past Medical History:   Diagnosis Date    Anxiety     Arthralgia     Asthma     Asthma     Chronic obstructive pulmonary disease ([address]     PFT 8/2015    Crack cocaine use     quit; used for 8 years    Diabetes ([address]     Hemorrhoid     Psychiatric disorder     ANXIETY.  Sleep apnea, obstructive 2016    Tobacco use      Past Surgical History:   Procedure Laterality Date    ENDOSCOPY, COLON, DIAGNOSTIC  2008    F/U 5 years Dr. Mack Sylvester HX GI      COLONOSCOPY X 1    HX HEENT      WISDOM TEETH EXTRACTION.      Family History   Problem Relation Age of Onset    Cancer Mother      colon    Diabetes Father     Hypertension Father      Social History   Substance Use Topics    Smoking status: Current Some Day Smoker     Packs/day: 0.25     Years: 22.00     Types: Cigarettes    Smokeless tobacco: Never Used Comment: smokes about 2-3  cigarettes/day    Alcohol use No        Review of Systems   Constitutional: Negative. Negative for malaise/fatigue. Eyes: Negative for blurred vision. Respiratory: Negative for shortness of breath. Cardiovascular: Negative for chest pain and leg swelling. Musculoskeletal: Negative. Neurological: Negative. Negative for dizziness and headaches. All other systems reviewed and are negative. Physical Exam   Constitutional: She is oriented to person, place, and time. She appears well-developed and well-nourished. No distress. HENT:   Head: Normocephalic and atraumatic. Neck: Carotid bruit is not present. Cardiovascular: Normal rate, regular rhythm and intact distal pulses. Exam reveals no gallop and no friction rub. Murmur heard. Systolic murmur is present with a grade of 2/6   2/6 ejection systolic murmur   Pulmonary/Chest: Effort normal and breath sounds normal. No respiratory distress. She has no wheezes. She has no rales. Musculoskeletal: She exhibits no edema. Neurological: She is alert and oriented to person, place, and time. Skin: She is not diaphoretic. Slight erythematous papule, but no erythematous ring noted on back   Psychiatric: She has a normal mood and affect. Her behavior is normal. Judgment and thought content normal.   Nursing note and vitals reviewed. ASSESSMENT and PLAN  Diagnoses and all orders for this visit:    1. Controlled type 2 diabetes mellitus without complication, without long-term current use of insulin (HCC)        - A1c was 6.3% on 04/20/18, lowered from A1c of 9.5% on 01/08/18. Pt continues with Trulicity 6.6AM 9.3RF weekly and Janumet 50-500mg BID. Advised pt to continue to work on diet and exercise. 2. Screening for malignant neoplasm of breast  -     FRANSISCO MAMMOGRAM SCREENING DIGITAL BILAT;  Future  - Advised pt to set up an appointment if she is not able to receive the free mammogram.    3. Severe obesity (BMI 35.0-39. 9) with comorbidity ([address]        - I have reviewed/discussed the above normal BMI with the patient. I have recommended the following interventions: dietary management education, guidance, and counseling, encourage exercise and monitor weight . 4. Tobacco use disorder         - The patient was counseled on the dangers of tobacco use, and was advised to quit. Reviewed strategies to maximize success, including removing cigarettes and smoking materials from environment. 5. Low HDL (under 40)         - Advised pt to continue to work on diet and exercise. Follow-up Disposition:  Return if symptoms worsen or fail to improve, for COPD. Medication risks/benefits/costs/interactions/alternatives discussed with patient. Advised patient to call back or return to office if symptoms worsen/change/persist.  If patient cannot reach us or should anything more severe/urgent arise he/she should proceed directly to the nearest emergency department. Discussed expected course/resolution/complications of diagnosis in detail with patient. Patient given a written after visit summary which includes her diagnoses, current medications and vitals. Patient expressed understanding with the diagnosis and plan. Written by susu Swanson, as dictated by Zach Giordano M.D.  12:41 PM - 12:54 PM   Total time spent with the patient 13 minutes, greater than 50% of time spent counseling patient.     I have reviewed and agree with the above note and have made corrections where appropriate, Dr. Gus Green MD

## 2019-04-22 NOTE — PROGRESS NOTES
FINDINGS: Bilateral digital screening mammography was performed and is  interpreted in conjunction with a computer assisted detection (CAD) system. No  suspicious masses or calcifications are identified. There has been no  significant change.     IMPRESSION  IMPRESSION:  BI-RADS 1: Negative. No mammographic evidence of malignancy.     RECOMMENDATIONS:  Next screening mammogram is recommended in one year.      [___] : Total Pregnancies: [unfilled]

## 2021-09-24 NOTE — TELEPHONE ENCOUNTER
Fell off the couch around 1120 and landed on wooden floor. Denies loc or vomiting. Hematoma to forehead.    Patient is requesting a letter from  Dr Parvin Sanchez to be sent (Fax) to the patients work stating that Dr Hull Grow be able to see the patient on 4/9 and the patient is to see him on 4/20. She needs to have her work date out extended until she is able to be seen. Patient is requesting the letter to be sent before 4/8     [facility]   [employer]  Phone # [phone], ext [phone]  Fax # [phone]       Best call back # [phone]

## (undated) DEVICE — NDL FLTR TIP 5 MIC 18GX1.5IN --

## (undated) DEVICE — CATH SUC CTRL PRT TRIFLO 14FR --

## (undated) DEVICE — AIRLIFE™ ADULT CUSHION NASAL CANNULA 14 FOOT (4.3) CRUSH-RESISTANT OXYGEN TUBING, AND U/CONNECT-IT ADAPTER: Brand: AIRLIFE™

## (undated) DEVICE — SYR LR LCK 1ML GRAD NSAF 30ML --

## (undated) DEVICE — BASIN SPNG 32OZ BLU STRL --

## (undated) DEVICE — SOL IRRIGATION INJ NACL 0.9% 500ML BTL

## (undated) DEVICE — SOLUTION IV 500ML 0.9% SOD CHL FLX CONT

## (undated) DEVICE — KIT IV STRT W CHLORAPREP PD 1ML

## (undated) DEVICE — KENDALL RADIOLUCENT FOAM MONITORING ELECTRODE -RECTANGULAR SHAPE: Brand: KENDALL

## (undated) DEVICE — MEDI-VAC NON-CONDUCTIVE SUCTION TUBING: Brand: CARDINAL HEALTH

## (undated) DEVICE — SYRINGE MED 20ML STD CLR PLAS LUERSLIP TIP N CTRL DISP

## (undated) DEVICE — 1200 GUARD II KIT W/5MM TUBE W/O VAC TUBE: Brand: GUARDIAN

## (undated) DEVICE — QUILTED PREMIUM COMFORT UNDERPAD,EXTRA HEAVY: Brand: WINGS

## (undated) DEVICE — CATHETER IV 20GA L1IN FEP STR HUB INTROCAN SFTY

## (undated) DEVICE — SYR 3ML LL TIP 1/10ML GRAD --

## (undated) DEVICE — SYR 5ML 1/5 GRAD LL NSAF LF --

## (undated) DEVICE — SOLIDIFIER FLUID 3000 CC ABSORB

## (undated) DEVICE — SYRINGE MED 10CC ECC TIP W/O NDL

## (undated) DEVICE — PREP SKN CHLRAPRP SNGL 1.75ML --

## (undated) DEVICE — 3M™ CUROS™ DISINFECTING CAP FOR NEEDLELESS CONNECTORS 270/CARTON 20 CARTONS/CASE CFF1-270: Brand: CUROS™

## (undated) DEVICE — CUFF BLD PRSS AD L SZ 12L FOR 32-43CM LIMB LNG VYN SFT W/O

## (undated) DEVICE — SET ADMIN 16ML TBNG L100IN 2 Y INJ SITE IV PIGGY BK DISP

## (undated) DEVICE — SINGLE USE BIOPSY VALVE MAJ-210: Brand: SINGLE USE BIOPSY VALVE (STERILE)

## (undated) DEVICE — TRAP SUC MUCOUS 70ML -- MEDICHOICE MEDLINE

## (undated) DEVICE — KIT COLON W/ 1.1OZ LUB AND 2 END

## (undated) DEVICE — SINGLE USE SUCTION VALVE MAJ-209: Brand: SINGLE USE SUCTION VALVE (STERILE)

## (undated) DEVICE — NEONATAL-ADULT SPO2 SENSOR: Brand: NELLCOR

## (undated) DEVICE — BAG SPEC BIOHZRD 10 X 10 IN --

## (undated) DEVICE — BITE BLK ENDOSCP AD 54FR GRN POLYETH ENDOSCP W STRP SLD